# Patient Record
Sex: FEMALE | Race: WHITE | NOT HISPANIC OR LATINO | Employment: OTHER | URBAN - METROPOLITAN AREA
[De-identification: names, ages, dates, MRNs, and addresses within clinical notes are randomized per-mention and may not be internally consistent; named-entity substitution may affect disease eponyms.]

---

## 2019-04-18 ENCOUNTER — HISTORICAL (OUTPATIENT)
Dept: ADMINISTRATIVE | Facility: HOSPITAL | Age: 74
End: 2019-04-18

## 2019-04-22 ENCOUNTER — HISTORICAL (OUTPATIENT)
Dept: ADMINISTRATIVE | Facility: HOSPITAL | Age: 74
End: 2019-04-22

## 2019-06-12 ENCOUNTER — HISTORICAL (OUTPATIENT)
Dept: ADMINISTRATIVE | Facility: HOSPITAL | Age: 74
End: 2019-06-12

## 2019-07-24 ENCOUNTER — HISTORICAL (OUTPATIENT)
Dept: ADMINISTRATIVE | Facility: HOSPITAL | Age: 74
End: 2019-07-24

## 2020-11-02 ENCOUNTER — HISTORICAL (OUTPATIENT)
Dept: ADMINISTRATIVE | Facility: HOSPITAL | Age: 75
End: 2020-11-02

## 2021-03-04 ENCOUNTER — HISTORICAL (OUTPATIENT)
Dept: ADMINISTRATIVE | Facility: HOSPITAL | Age: 76
End: 2021-03-04

## 2021-03-18 ENCOUNTER — HISTORICAL (OUTPATIENT)
Dept: SURGERY | Facility: HOSPITAL | Age: 76
End: 2021-03-18

## 2021-06-07 ENCOUNTER — HISTORICAL (OUTPATIENT)
Dept: RADIOLOGY | Facility: HOSPITAL | Age: 76
End: 2021-06-07

## 2022-04-07 ENCOUNTER — HISTORICAL (OUTPATIENT)
Dept: ADMINISTRATIVE | Facility: HOSPITAL | Age: 77
End: 2022-04-07

## 2022-04-23 VITALS
BODY MASS INDEX: 38.16 KG/M2 | SYSTOLIC BLOOD PRESSURE: 104 MMHG | WEIGHT: 215.38 LBS | DIASTOLIC BLOOD PRESSURE: 59 MMHG | HEIGHT: 63 IN

## 2022-04-30 NOTE — ED PROVIDER NOTES
Patient:   Alexa Rodriguez             MRN: [id number]            FIN: [id number]               Age:   73 years     Sex:  Female     :  1945   Associated Diagnoses:   Closed left trimalleolar fracture; Dislocation of left ankle joint   Author:   Afia Rosales MD      Basic Information   Time seen: Date & time 2019 10:25:00.   History source: Patient, EMS.   Arrival mode: Ambulance.   History limitation: Clinical condition.   Additional information: Chief Complaint from Nursing Triage Note : Chief Complaint   2019 10:07 CDT      Chief Complaint           c/o fall getting out of car with left ankle pain. Possible dislocation with deformity noted to left ankle. 2+ palpable pedal pulse.  .      History of Present Illness   The patient presents with 73 year old female presents to the ED via EMS following fall while stepping out of her car. Obvious left ankle deformity. No LOC or head injury.  Had a piece of toast PTA..  The onset was just prior to arrival.  The course/duration of symptoms is constant.  Type of injury: fall.  Location: Left ankle. The character of symptoms is pain and loss of mobility.  The degree at present is moderate.  There are exacerbating factors including movement, weight bearing and walking.  The relieving factor is none.  The location where the incident occurred was car.  Risk factors consist of age.  Prior episodes: none.  Therapy today: emergency medical services.  Associated symptoms: none.  Additional history: none.        Review of Systems   Constitutional symptoms   Skin symptoms:  Abrasions, No rash,    Eye symptoms:  Vision unchanged, No blurred vision,    Respiratory symptoms:  No shortness of breath,    Cardiovascular symptoms:  No chest pain, no palpitations, no syncope.    Gastrointestinal symptoms:  No abdominal pain, no nausea, no vomiting.    Genitourinary symptoms   Musculoskeletal symptoms:  Reports: Left, ankle pain, obvious deformity.   Neurologic  symptoms:  No headache, no dizziness.    Hematologic/Lymphatic symptoms:  Bleeding tendency negative,              Additional review of systems information: All other systems reviewed and otherwise negative.      Health Status   Allergies:    Allergic Reactions (Selected)  Severity Not Documented  Codeine- Unsure.  Demerol HCl- Unsure..   Medications:  (Selected)   Documented Medications  Documented  Lasix 20 mg oral tablet: 20 mg = 1 tab(s), Oral, Daily  Oyster Shell Calcium with Vitamin D: 2 tab(s), Oral, Daily, 0 Refill(s)  Pepcid 20 mg oral tablet: 10 mg, Oral, At Bedtime  aspirin 81 mg oral tablet: 81 mg = 1 tab(s), Oral, Daily  atorvastatin 40 mg oral tablet: 40 mg = 1 tab(s), Oral, Daily  citalopram 10 mg oral tablet: 10 mg = 1 tab(s), Oral, Daily  clobetasol 0.05% topical cream: 1 silvia, TOP, As Directed  losartan 25 mg oral tablet: 25 mg = 1 tab(s), Oral, Daily  metoprolol succinate 100 mg oral capsule, extended release: 300 mg = 3 cap(s), Oral, Daily  omeprazole 20 mg oral DR capsule: 20 mg = 1 cap(s), Oral, Daily, 0 Refill(s).   Immunizations: Per nurse's notes.      Past Medical/ Family/ Social History   Medical history: Negative.   Surgical history: Negative.   Family history: Not significant.   Social history:    Social & Psychosocial Habits    Tobacco  04/18/2019  Use: Never (less than 100 in l    Patient Wants Consult For Cessation Counseling N/A.      Physical Examination               Vital Signs   Vital Signs   4/18/2019 10:07 CDT      Temperature Oral          36.6 DegC                             Temperature Oral (calculated)             97.88 DegF                             Peripheral Pulse Rate     56 bpm  LOW                             Respiratory Rate          16 br/min                             SpO2                      94 %                             Oxygen Therapy            Room air                             Systolic Blood Pressure   164 mmHg  HI                              Diastolic Blood Pressure  73 mmHg  .      Vital Signs (last 24 hrs)_____  Last Charted___________  Temp Oral     36.6 DegC  (APR 18 10:07)  Heart Rate Peripheral   62 bpm  (APR 18 11:15)  Resp Rate         21 br/min  (APR 18 11:15)  SBP      91 mmHg  (APR 18 11:15)  DBP      76 mmHg  (APR 18 11:15)  SpO2      97 %  (APR 18 11:15).   Measurements   4/18/2019 10:07 CDT      Weight Dosing             93 kg                             Weight Measured and Calculated in Lbs     205.03 lb                             Weight Estimated          93 kg                             Height/Length Dosing      157 cm                             Height/Length Estimated   157 cm                             Body Mass Index Estimated 37.73 kg/m2  .   Basic Oxygen Information   4/18/2019 10:07 CDT      SpO2                      94 %                             Oxygen Therapy            Room air  .   General:  Alert, no acute distress.    Skin:  Warm, dry.    Head:  Normocephalic, atraumatic.    Neck:  Supple, trachea midline.    Eye:  Normal conjunctiva.   Ears, nose, mouth and throat:  Oral mucosa moist.   Cardiovascular:  Regular rate and rhythm, Normal peripheral perfusion, No edema, Arterial pulses: Bilateral, lower extremity, dorsalis pedis, 2+.    Respiratory:  Respirations are non-labored.   Gastrointestinal:  Soft, Nontender, Non distended.    Musculoskeletal:  Old abrasion to left knee from previous fall, Ankle/foot: Left, tenderness, swelling, dislocation deformity, not aligned.    Neurological:  Alert and oriented to person, place, time, and situation, normal sensory observed, normal motor observed.    Psychiatric:  Cooperative.      Medical Decision Making   Documents reviewed:  Emergency department nurses' notes.   Orders  Launch Order Profile (Selected)   Inpatient Orders  Completed  CT Ankle Left W/O Contrast: Stat, 04/18/19 11:35:00 CDT, Other (please specify), trimalleolar fracture - surgical planning per Dr. Tavares,  None, Stretcher, Rad Type, Schedule this test, 04/18/19 11:35:00 CDT  Norco 7.5 mg-325 mg oral tablet: 1 tab(s), form: Tab, Oral, Once, first dose 04/18/19 13:21:00 CDT, stop date 04/18/19 13:21:00 CDT, STAT  XR Ankle Left 2 Views: Stat, 04/18/19 10:57:00 CDT, Post Reduction, None, Stretcher, Rad Type, Not Scheduled, 04/18/19 10:57:00 CDT  XR Ankle Left Minimum 3 Views: Stat, 04/18/19 10:27:00 CDT, Fall, None, Stretcher, Rad Type, Not Scheduled, 04/18/19 10:27:00 CDT  morphine: 4 mg, form: Injection, IV, AdHoc, first dose 04/18/19 11:49:00 CDT, stop date 04/18/19 11:49:00 CDT  ondansetron: 4 mg, form: Injection, IV, AdHoc, first dose 04/18/19 10:41:00 CDT, stop date 04/18/19 10:41:00 CDT  ondansetron: 4 mg, form: Injection, IV, AdHoc, first dose 04/18/19 11:49:00 CDT, stop date 04/18/19 11:49:00 CDT  Discontinued  Fall Risk Protocol: 04/18/19 10:36:07 CDT, Constant Order  Normal Saline (0.9% NS) IV 1,000 mL: 1,000 mL, 1,000 mL, IV, 999 mL/hr, start date 04/18/19 11:26:00 CDT  O2 Therapy: 04/18/19 12:08:59 CDT  Prescriptions  Completed  Norco 7.5 mg-325 mg oral tablet: 1 tab(s), Oral, q4hr, PRN PRN for pain, X 5 day(s), # 20 tab(s), 0 Refill(s).   Results review:     No qualifying data available.   Radiology results:  Rad Results (ST)  < 12 hrs   Accession: [id number]  Order: XR Ankle Left 2 Views  Report Dt/Tm: 04/18/2019 11:14  Report:   History:  Fracture     Comparison:  Earlier today     Findings:  2 views of the left ankle. Interval reduction of trimalleolar fracture  with improved position and alignment.     Impression:  As above.      Accession: [id number]  Order: XR Ankle Left Minimum 3 Views  Report Dt/Tm: 04/18/2019 10:48  Report:   Indication: Fall, injury     FINDINGS: There are acute fractures identified in the distal left  tibia and fibula. Significant lateral displacement of the distal  fracture fragments is visible including maintenance of the normal  anatomic position of the dome of the talus  with both the medial and  lateral malleoli although there is severe eversion and posterior  displacement of the left foot with respect to the shafts of the tibia  and fibula. An additional fracture fragment is visible superior to the  dome of the talus and is felt to be displaced from the medial aspect  of the distal tibia superior to the base of the medial malleolus. No  fractures are appreciated in the bones of the hindfoot.     IMPRESSION: Comminuted fractures of the distal left tibia and fibula  described above including lateral and posterior displacement of the  left foot from the shafts of the tibia and fibula.    * Final Report *    Reason For Exam  trimalleolar fracture - surgical planning per Dr. Tavares;Other (please specify)    Radiology Report  EXAMINATION  CT Extremity Lower Left W/O Contrast     TECHNIQUE  Helical-acquisition CT images of the left ankle were obtained without  the intravenous administration of iodinated contrast media.  Multiplanar reconstructions were accomplished by a CT technologist at  a separate workstation and pushed to PACS for physician review.     Total DLP (mGy-cm): 416 (value may include radiation due to  concomitantly performed CT imaging)  Automated tube current modulation and/or weight-based exposure dosing  is utilized, when appropriate, to reach lowest reasonably achievable  exposure rate.     INDICATION  Trimalleolar fracture, preoperative planning.     Comparison: No similar modality comparison is available. Ankle  radiographs performed earlier the same day were reviewed.     FINDINGS  Images were reviewed in bone and soft tissue windows.     There is interval reduction of the previously described tibiotalar  dislocation, with approximately 9 mm residual lateral talar  subluxation. Corresponding increase of the medial and lateral clear  spaces are also evident, approximately 9 mm a dislocation.  Additionally, there is asymmetric widening of tibiotalar spacing,  greater  at the medial aspect.     There is markedly improved alignment and angulation of the medial  malleolus and distal fibular fractures, with minimal displacement of  the posterior malleolus. Subtle nondisplaced cortical disruption at  the anterior aspect of the tibial metaphysis is better appreciated  relative to the radiographic views.   No other convincing acute osseous irregularity is identified. Chronic  posttraumatic changes of the inferior medial malleolus contour are  appreciated, with multiple well-corticated osseous fragment.  Additionally, there are scattered degenerative changes throughout the  midfoot.     The courses of the medial and lateral posterior flexor tendons are  grossly normal, with no convincing evidence of dislocation or fracture  entrapment. No findings of high-grade tendon disruption or retraction  are visualized. The anterior tendon group is unremarkable.  The regional musculature is unremarkable.  Disorganized. Similar subcutaneous edema is visualized diffusely, with  no drainable collection or evidence of large joint effusion.     Scattered vascular calcification are evident. No convincing acute or  focal neurovascular abnormality is visualized within modality/protocol  limitations.     IMPRESSION  1.  Interval reduction of tibiotalar dislocation and associated  fractures, with mild residual malalignment described above.     2.  More conspicuous nondisplaced cortical disruption of the distal  anterior tibial metaphysis.     3.  No definite evidence of tendon dislocation/entrapment, or other  focal soft tissue abnormality.       Signature Line  Electronically Signed By: Rubén Xiao MD  Date/Time Signed: 04/18/2019 13:24   .      Reexamination/ Reevaluation   Vital signs   Basic Oxygen Information   4/18/2019 10:07 CDT      SpO2                      94 %                             Oxygen Therapy            Room air     Notes: Ankle fracture dislocation reduced with improved  alignment and post-procedurally neurovascularly intact. Discussed with orthopedics on call who requests CT for surgical planning, then ok with DC home and follow up in clinic early next week to plan operative repair. Will DC home w/ analgesics. I informed the patient of the risks associated with opiate use and the option to fill less than the prescribed amount. ED return precautions discussed with patient at the bedside and provided in the printed discharge instructions. All questions answered. Patient should follow up with ortho. .      Procedure   Fracture/ Dislocation Procedure   Confirmed: Patient, procedure, side, and site correct.    Consent: Patient, Has signed consent.    Indication: Dislocation, Fracture.    Location: Left, Ankle.    Pre procedure exam: Circulation, motor, and sensory intact.    Procedural sedation: Propofol , IV, See nurse's notes.    Monitoring: Cardiac, blood pressure, continuous pulse oximetry, See nurse's notes.    Technique: Traction-countertraction method, Internal-external rotation method.    Post-procedure exam: Circulation, motor, and sensory intact, Alignment improved, Recovered from sedation.    Immobilization: Splint: Posterior, stirrup.    Patient tolerated: Well.    Complications: None.    Performed by: Self.    Total time: 25 minutes.    Procedural sedation   Confirmed: Patient and procedure correct.    Consent: Patient, Has signed consent.    Indication: Closed reduction, Fracture manipulation.    Monitoring: Cardiac, blood pressure, continuous pulse oximetry.    Preparation: Suction, IV access, Constant attendance, Supplemental oxygen.    ASA Class: 2- mild systemic disease.    Physical exam: See physical exam documentation.    Pre sedation vital signs: See nurse's notes.    Procedural sedation: See nurse's notes.    Post sedation vital signs: See nurse's notes.    Procedure Time: See hospital procedure form, Start time: 4/18/2019 10:48:00, Sedation achieved in 7 minutes,  End time: 4/18/2019 10:57:00.    Post sedation condition: Improved.    Patient tolerated: Well.    Complications: None.    Performed by: Self.       Impression and Plan   Diagnosis   Closed left trimalleolar fracture ([id number] S82.852A)   Dislocation of left ankle joint ([id number] S93.05XA)      Calls-Consults   -  4/18/2019 11:17:00 , Isabel MICHELE, Aaron SWANSON, phone call, consult, will be out of town and unable to repair, advises call on call orthopedist.    -  Chaitanya ALEJO MD, Wally WATSON, orthopedics, recommends CT, DC home, follow up next week for surgical repair.    Plan   Condition: Improved.    Disposition: Discharged: Time  4/18/2019 13:21:00, to home.    Prescriptions: Launch prescriptions   Pharmacy:  [place] 7.5 mg-325 mg oral tablet (Prescribe): 1 tab(s), Oral, q4hr, PRN PRN for pain, X 5 day(s), # 20 tab(s), 0 Refill(s).    Patient was given the following educational materials: Ankle Fracture, Ankle Dislocation, Cast or Splint Care ([facility]), Moderate Conscious Sedation, Adult, Care After, Opiods - LF What You Need to Know About Prescription Opioid Pain Medicine (Custom).    Follow up with: Wally Tavares  4/22/2019 Call for followup appointment; Report to Emergency Department if symptoms return or worsen.    Counseled: Patient, Regarding diagnosis, Regarding diagnostic results, Regarding treatment plan, Regarding prescription, Patient indicated understanding of instructions.    Notes: IRamon, acted solely as a scribe for and in the presence of Dr. Rosales who performed the service., I, Afia Rosales, have independently performed the history, physical, medical decision making and procedures as documented above and agree with the scribe's documentation. .

## 2022-04-30 NOTE — DISCHARGE SUMMARY
Patient:   Alexa Rodriguez             MRN: [id number]            FIN: [id number]               Age:   73 years     Sex:  Female     :  1945   Associated Diagnoses:   Fracture, trimalleolar   Author:   Chaitanya ALEJO MD, Wally WATSON      Discharge Information   Discharge Summary Information:  Admitted  2019, Discharged  2019, Admitting diagnosis.         Admitting physician: Wally Tavares JR., MD.         Discharge diagnosis: Fracture, trimalleolar ([id number] S82.853A).       Physical Examination   Vital Signs   2019 7:40 CDT       Temperature Oral          36.6 DegC                             Temperature Oral (calculated)             97.88 DegF                             Peripheral Pulse Rate     80 bpm                             Respiratory Rate          18 br/min                             SpO2                      94 %                             Systolic Blood Pressure   166 mmHg  HI                             Diastolic Blood Pressure  78 mmHg                             Mean Arterial Pressure, Cuff              107 mmHg    2019 6:00 CDT       Oxygen Therapy            Room air    2019 3:00 CDT       Temperature Oral          36.6 DegC                             Temperature Oral (calculated)             97.88 DegF                             Peripheral Pulse Rate     77 bpm                             SpO2                      97 %                             Oxygen Therapy            Room air                             Systolic Blood Pressure   133 mmHg                             Diastolic Blood Pressure  78 mmHg    2019 0:00 CDT       Temperature Oral          36.9 DegC                             Temperature Oral (calculated)             98.42 DegF                             Peripheral Pulse Rate     86 bpm                             Respiratory Rate          18 br/min                             SpO2                      96 %                             Oxygen  Therapy            Room air                             Oxygen Therapy            Room air                             Systolic Blood Pressure   153 mmHg  HI                             Diastolic Blood Pressure  67 mmHg     Splint c/d/i. +EHL,FHL. SITLT. +BCR      Hospital Course   Hospital Course   Admitted from: from home.     Transferred via: by car.     Admitting diagnosis: Fracture, trimalleolar (CPV03-FR S82.853A).     Admission disposition: admit to surgery.     Length of stay: days  1.        Discharge Plan   Discharge Summary Plan   Discharge Status: improved.     Discharge instructions given: to patient, to caregiver.     Discharge disposition: discharge to home (into the care of family member, with home health care).     Prescriptions: reviewed (with patient, with caregiver), written and given to patient.

## 2022-04-30 NOTE — OP NOTE
Patient:   Alexa Rodriguez             MRN: [id number]            FIN: [id number]               Age:   75 years     Sex:  Female     :  1945   Associated Diagnoses:   None   Author:   Wally Tavares Jr, MD      SURGEON: Wally Tavares MD   ASSISTANT: Laura Luna NP. Mrs. Luna was essential in manipulating the leg and closure.    PREOPERATIVE DIAGNOSIS:   Right knee medial meniscus tear    POSTOPERATIVE DIAGNOSIS:   Right knee medial meniscus tear, medial femoral condyle chondromalacia,     PROCEDURE PERFORMED:  1.  Right knee arthroscopic partial medial meniscectomy      ESTIMATED BLOOD LOSS: 10cc.    COMPLICATIONS: None.    TOURNIQUET TIME: ~15 minutes.    ANTIBIOTICS: Ancef     INDICATIONS FOR PROCEDURE: Alexa is a 75y.o. female who has had ongoing right knee pain. The patient has had to limit activity due to intermittent pain & occasional mechanical symptoms. An MRI was performed that showed a meniscus tear that I felt would be amenable to arthroscopic debridement. The patient decided to opt for surgery after failing conservative management.    OPERATIVE REPORT: Alexa was initially seen in the preoperative holding area where history and physical were reviewed without change. The operative leg was marked, consents were reviewed, and any questions were answered for the patient and family. The patient was then taken back to the operating room, placed supine on the operating table with all bony prominences padded. Then a nonsterile tourniquet was placed around the upper thigh. The patient was induced under general anesthesia. The operative lower extremity was prepped and draped in standard sterile fashion. A timeout led by the surgeon was performed, and preoperative antibiotics were given. The limp was exsanguinated with gravity. The tourniquet was raised to 100 mmHg over the systolic blood pressure.    The knee was then flexed and the inferolateral portal was created with an #11 blade  scalpel.    The camera was introduced, using the blunt trocar, into the suprapatellar pouch. The undersurface of the patella was found to have no chondral wear and the trochlear groove was found to have no chondral wear . The camera was then taken down into the lateral gutter, where no loose bodies and no plica were found. The camera was then brought into the medial gutter, where no loose bodies and no plica were seen. The camera was then brought into the medial compartment.    An inferomedial portal was then localized with a spinal needle, and created using an #11 blade. The medial meniscus was probed and found to have a complex tear involving the posterior body and horn. A combination of baskets and mani were used to debride the meniscal flap down to a stable margin of approximately 60 percent remaining and then shaver was used to smooth the edges. The medial femoral condyle was found to have diffuse grade 3. The medial tibial plateau demonstrated minimal wear.    The camera was then turned to the notch, where the ACL was found to be intact.    Then the leg was brought into figure-of-four position. The camera was brought into the lateral compartment. The lateral meniscus was probed and found to have no tears.  The lateral femoral condyle was found to have minimal chondral wear. The lateral tibial plateau had minimal chondral wear.    The knee was once more examined for loose bodies, of which none were found. The knee was then evacuated of all fluids. The portals were closed with 3-0 monocyrl interrupted sutures and steristrips. 10mL of 0.25% Bupivicaine were infiltrated around each portal. A sterile dressing was placed, and the tourniquet was deflated after _ minutes. The patient was awakened from anesthesia and taken to the postoperative care unit in stable condition.

## 2022-04-30 NOTE — OP NOTE
DATE OF SURGERY:    04/25/2019    SURGEON:  Wally Tavares Jr, MD  ASSISTANT:  None    PREOP DIAGNOSIS:  Left trimalleolar ankle fracture.    POSTOP DIAGNOSIS:  Left trimalleolar ankle fracture.    PROCEDURE:  Open reduction and internal fixation of left trimalleolar ankle fracture including posterior malleolus.    ANESTHESIA:  General plus regional.    COMPLICATIONS:  None.    IMPLANTS:  Synthes.    HISTORY OF PRESENT ILLNESS:  Ms. Liu is a very pleasant 73-year-old female who fell, sustained a displaced left trimalleolar ankle fracture.  I recommended repair.  I discussed with her the risks, benefits, and alternatives to therapy, and she elected to proceed    DESCRIPTION OF PROCEDURE:  Ms. Liu was initially seen in preoperative unit where history and physical were reviewed without change.  Her left leg was marked.  Her consents were reviewed.  All questions were answered.  She was taken to the operating room and placed initially supine on the operating table where general anesthesia was induced, then flipped to the prone position where all bony prominences were padded.  Her left lower extremity was prepped and draped in a sterile fashion.  Attending led time-out, confirmed the operative side.  Preoperative antibiotics were administered.  Then we began the procedure.       I started with standard posterolateral approach.  I sharply incised through skin and spread to subcutaneous tissue, being mindful of the sural nerve, carried down through the fascia and through the interval between the Achilles and the peroneal tendon, and I came down onto the posterior aspect of the tibia, and her posterior malleolus was lifted off.  I was able to reduce this by plantar flexing the foot and pushing down onto the posterior malleolus.  I then pinned it temporarily with a K-wire and then placed a 4-hole VAT plate and secured it in a buttress-type fashion, initially on the shaft and then a couple of locking screws in  the actual piece as well.  After this, I then turned my attention to the lateral side.  I then retracted the peroneal tendons medially and was able to visualize the fibula.  It was a comminuted fracture and I was able to restore her length and then placed a 7-hole 1/3 tubular in a bridge-type fashion in order to reapproximate the fracture.  Happy with this, irrigated the wound and closed the incision in layers.  I then temporarily placed a dressing and then flipped her supine and re-prepped and draped.  I then turned my attention to the medial malleolus.  I made an incision directly over the medial malleolus, sharply incising through skin and subcutaneous tissue, being mindful of the saphenous neurovascular bundle, came down to the fracture site.  There was some periosteum which was within the fracture.  I was able to book open the fracture and then clean this out and then was able to obtain anatomic reduction.  I then placed two 4.0 mm partially threaded screws, 1 anteriorly and 1 posteriorly.  I then again irrigated the wound, closed the incision in layers.  She was placed into a sterile dressing and a bivalve cast.  She was awoken from anesthesia and transferred to the postoperative care unit in good condition.        ______________________________  MD MILENA Veliz Jr/TIFFANI  DD:  04/25/2019  Time:  02:51PM  DT:  04/25/2019  Time:  03:13PM  Job #:  [id number]

## 2022-05-01 NOTE — HISTORICAL [facility]
This is a historical note converted from Shannon. Formatting and pictures may have been removed.  Please reference Shannon for original formatting and attached multimedia. Chief Complaint  6.5 week s/p Left ankle Orif, Sx-4/25/19--Gl--7/24/19. ?Pt states shes doing great , no pain or swelling. ?Pt ?is ambulating today with her walking boot.  History of Present Illness  4/25/2019: ORIF left trimalleolar ankle fracture  ?   She returns today.? Her ankle pain is under good control. ? [1]  Physical Exam  Vitals & Measurements  BP:?130/74?  HT:?157?cm? WT:?91?kg? BMI:?36.92?  Incision healed. ?Surprisingly good range of motion of the ankle. ?Nontender over the fracture  Assessment/Plan  Closed trimalleolar fracture of ankle?S82.853A  She can ambulate in the boot.? She can transition out of the boot on July 15. ?I will see her back?in late July with radiographs of the left ankle  Ordered:  Clinic Follow up, *Est. 07/24/19 3:00:00 CDT, Order for future visit, Closed trimalleolar fracture of ankle, [facility]  Post-Op follow-up visit [id number] PC, Closed trimalleolar fracture of ankle, [facility], 06/12/19 14:53:00 CDT  XR Ankle Left Minimum 3 Views, Routine, 06/12/19 13:56:00 CDT, Fracture, None, Stretcher, Patient Has IV?, Rad Type, Closed trimalleolar fracture of ankle, Not Scheduled, 06/12/19 13:56:00 CDT  ?  Referrals  Clinic Follow up, *Est. 07/24/19 3:00:00 CDT, Order for future visit, Closed trimalleolar fracture of ankle, [facility]   Problem List/Past Medical History  Ongoing  Fracture, trimalleolar  Obesity  Historical  Depressed  Falls  Hyperlipidemia  Hypertension  Hyperthyroidism  Neuropathy  Reflux flow  Weight  Procedure/Surgical History  Injection, anesthetic agent; other peripheral nerve or branch (04/25/2019)  Introduction of Anesthetic Agent into Peripheral Nerves and Plexi, Percutaneous Approach (04/25/2019)  Open treatment of trimalleolar ankle fracture, includes internal fixation, when  performed, medial and/or lateral malleolus; with fixation of posterior lip (04/25/2019)  ORIF Ankle (Left) (04/25/2019)  Reposition Left Fibula with Internal Fixation Device, Open Approach (04/25/2019)  Reposition Left Tibia with Internal Fixation Device, Open Approach (04/25/2019)  Application of short leg splint (calf to foot) (04/18/2019)  Immobilization of Left Lower Leg using Splint (04/18/2019)  Cataract  Hysterectomy  Mohs surgery  Tonsil   Medications  aspirin 81 mg oral Delayed Release (EC) tablet, 81 mg= 1 tab(s), Oral, BID  aspirin 81 mg oral tablet, 81 mg= 1 tab(s), Oral, Daily,? ?Not taking: Last Dose Date/Time Unknown  atorvastatin 40 mg oral tablet, 40 mg= 1 tab(s), Oral, Daily  cetirizine 5 mg oral tablet, 10 mg= 2 tab(s), Oral, Daily  citalopram 10 mg oral tablet, 10 mg= 1 tab(s), Oral, Daily  clobetasol 0.05% topical cream, 1 silvia, TOP, As Directed  Colace 100 mg oral capsule, 200 mg= 2 cap(s), Oral, Daily  Lasix 20 mg oral tablet, 20 mg= 1 tab(s), Oral, Daily  losartan 25 mg oral tablet, 25 mg= 1 tab(s), Oral, Daily  metoprolol succinate 100 mg oral capsule, extended release, 100 mg= 1 cap(s), Oral, Daily  omeprazole 20 mg oral DR capsule, 20 mg= 1 cap(s), Oral, Daily  Oyster Shell Calcium with Vitamin D, 2 tab(s), Oral, Daily  Pepcid 20 mg oral tablet, 10 mg, Oral, At Bedtime  TheraTears Contact Lens ophthalmic solution, 1 drop(s), OPTH, BID, PRN  Allergies  Demerol HCl?(unsure)  codeine?(unsure)  morphine?(itching)  Social History  Abuse/Neglect  No, No, Yes, 06/12/2019  Alcohol  Current, Wine, 04/22/2019  Tobacco  Never (less than 100 in lifetime), No, 06/12/2019  Family History  Family history is negative  Health Maintenance  Health Maintenance  ???Pending?(in the next year)  ??? ??OverDue  ??? ? ? ?Advance Directive due??01/01/19??and every 1??year(s)  ??? ? ? ?Alcohol Misuse Screening due??01/01/19??and every 1??year(s)  ??? ? ? ?Cognitive Screening due??01/01/19??and every 1??year(s)  ??? ?  ? ?Functional Assessment due??01/01/19??and every 1??year(s)  ??? ? ? ?Geriatric Depression Screening due??01/01/19??and every 1??year(s)  ??? ? ? ?Breast Cancer Screening (Senior Wellness) due??01/19/19??and every 2??year(s)  ??? ??Due?  ??? ? ? ?ADL Screening due??06/12/19??and every 1??year(s)  ??? ? ? ?Bone Density Screening due??06/12/19??Variable frequency  ??? ? ? ?Colorectal Screening (Senior Wellness) due??06/12/19??and every?  ??? ? ? ?Pneumococcal Vaccine due??06/12/19??Variable frequency  ??? ? ? ?Pneumococcal Vaccine due??06/12/19??and every?  ??? ? ? ?Tetanus Vaccine due??06/12/19??and every 10??year(s)  ??? ? ? ?Zoster Vaccine due??06/12/19??and every 100??year(s)  ??? ??Due In Future?  ??? ? ? ?Fall Risk Assessment not due until??01/01/20??and every 1??year(s)  ??? ? ? ?Obesity Screening not due until??01/01/20??and every 1??year(s)  ??? ? ? ?Hypertension Management-BMP not due until??04/21/20??and every 1??year(s)  ??? ? ? ?Aspirin Therapy for CVD Prevention not due until??04/26/20??and every 1??year(s)  ??? ? ? ?Hypertension Management-Blood Pressure not due until??05/07/20??and every 1??year(s)  ???Satisfied?(in the past 1 year)  ??? ??Satisfied?  ??? ? ? ?Aspirin Therapy for CVD Prevention on??04/26/19.??Satisfied by Wally Tavares JR., MD  ??? ? ? ?Blood Pressure Screening on??06/12/19.??Satisfied by Usha Maynard  ??? ? ? ?Body Mass Index Check on??06/12/19.??Satisfied by Usha Maynard  ??? ? ? ?Diabetes Screening on??04/22/19.??Satisfied by Andrea Zhou  ??? ? ? ?Fall Risk Assessment on??04/26/19.??Satisfied by Jahaira Gloria RN  ??? ? ? ?Hypertension Management-Blood Pressure on??06/12/19.??Satisfied by Usha Maynard  ??? ? ? ?Obesity Screening on??06/12/19.??Satisfied by Usha Maynard  ?  ?  Diagnostic Results  Left ankle radiographs show interval healing     [1]?Office Visit Note; Chaitanya ALEJO MD, Wally WATSON 05/08/2019 14:48 CDT

## 2022-05-01 NOTE — HISTORICAL [facility]
This is a historical note converted from Shannon. Formatting and pictures may have been removed.  Please reference Shannon for original formatting and attached multimedia. Chief Complaint  3 month s/p Left ankle ORIF. here for eval and xrays. ?sx 4/25/19 ?gl. 7/24/19.  History of Present Illness  4/25/2019: ORIF left trimalleolar ankle fracture  ?   She returns today.? She is out of the brace.? She is ambulating without assistive devices.  Physical Exam  Vitals & Measurements  BP:?130/74?  HT:?157?cm? WT:?91?kg? BMI:?36.92?  Incisions healed. ?Range of motion full. ?Distally she is neurovascular intact  Assessment/Plan  Closed displaced trimalleolar fracture of lower leg?S82.853A  Doing well status post above.? Fracture looks healed.? Gradually increase activities. ?I will see her back as needed  Ordered:  Post-Op follow-up visit [id number] PC, Closed displaced trimalleolar fracture of lower leg, [facility], 07/24/19 14:24:00 CDT  XR Ankle Left Minimum 3 Views, Routine, 07/24/19 13:57:00 CDT, Pain, None, Stretcher, Patient Has IV?, Rad Type, Closed displaced trimalleolar fracture of lower leg, Not Scheduled, 07/24/19 13:57:00 CDT  ?  Orders:  Clinic Follow-up PRN, 07/24/19 14:24:00 CDT, Future Order, [facility]  Referrals  Clinic Follow-up PRN, 07/24/19 14:24:00 CDT, Future Order, [facility]   Problem List/Past Medical History  Ongoing  Fracture, trimalleolar  Obesity  Historical  Depressed  Falls  Hyperlipidemia  Hypertension  Hyperthyroidism  Neuropathy  Reflux flow  Weight  Procedure/Surgical History  Injection, anesthetic agent; other peripheral nerve or branch (04/25/2019)  Introduction of Anesthetic Agent into Peripheral Nerves and Plexi, Percutaneous Approach (04/25/2019)  Open treatment of trimalleolar ankle fracture, includes internal fixation, when performed, medial and/or lateral malleolus; with fixation of posterior lip (04/25/2019)  ORIF Ankle (Left) (04/25/2019)  Reposition Left Fibula with  Internal Fixation Device, Open Approach (04/25/2019)  Reposition Left Tibia with Internal Fixation Device, Open Approach (04/25/2019)  Application of short leg splint (calf to foot) (04/18/2019)  Immobilization of Left Lower Leg using Splint (04/18/2019)  Cataract  Hysterectomy  Mohs surgery  Tonsil   Medications  aspirin 81 mg oral Delayed Release (EC) tablet, 81 mg= 1 tab(s), Oral, BID  aspirin 81 mg oral tablet, 81 mg= 1 tab(s), Oral, Daily  atorvastatin 40 mg oral tablet, 40 mg= 1 tab(s), Oral, Daily  cetirizine 5 mg oral tablet, 10 mg= 2 tab(s), Oral, Daily  citalopram 10 mg oral tablet, 10 mg= 1 tab(s), Oral, Daily  clobetasol 0.05% topical cream, 1 silvia, TOP, As Directed  Colace 100 mg oral capsule, 200 mg= 2 cap(s), Oral, Daily  Lasix 20 mg oral tablet, 20 mg= 1 tab(s), Oral, Daily  losartan 25 mg oral tablet, 25 mg= 1 tab(s), Oral, Daily  losartan 50 mg oral tablet, 50 mg= 1 tab(s), Oral, Daily  metoprolol succinate 100 mg oral capsule, extended release, 100 mg= 1 cap(s), Oral, Daily  omeprazole 20 mg oral DR capsule, 20 mg= 1 cap(s), Oral, Daily  Oyster Shell Calcium with Vitamin D, 2 tab(s), Oral, Daily  Pepcid 20 mg oral tablet, 10 mg, Oral, At Bedtime  terbinafine 1% topical cream, 1 silvia, TOP, BID  TheraTears Contact Lens ophthalmic solution, 1 drop(s), OPTH, BID, PRN  Allergies  Demerol HCl?(unsure)  codeine?(unsure)  morphine?(itching)  Social History  Abuse/Neglect  No, No, Yes, 06/12/2019  Alcohol  Current, Wine, 04/22/2019  Tobacco  Never (less than 100 in lifetime), No, 07/24/2019  Family History  Family history is negative  Health Maintenance  Health Maintenance  ???Pending?(in the next year)  ??? ??OverDue  ??? ? ? ?Advance Directive due??01/01/19??and every 1??year(s)  ??? ? ? ?Alcohol Misuse Screening due??01/01/19??and every 1??year(s)  ??? ? ? ?Cognitive Screening due??01/01/19??and every 1??year(s)  ??? ? ? ?Functional Assessment due??01/01/19??and every 1??year(s)  ??? ? ? ?Geriatric  Depression Screening due??01/01/19??and every 1??year(s)  ??? ? ? ?Breast Cancer Screening (Senior Wellness) due??01/19/19??and every 2??year(s)  ??? ??Due?  ??? ? ? ?ADL Screening due??07/24/19??and every 1??year(s)  ??? ? ? ?Bone Density Screening due??07/24/19??Variable frequency  ??? ? ? ?Colorectal Screening (Senior Wellness) due??07/24/19??and every?  ??? ? ? ?Pneumococcal Vaccine due??07/24/19??Variable frequency  ??? ? ? ?Pneumococcal Vaccine due??07/24/19??and every?  ??? ? ? ?Tetanus Vaccine due??07/24/19??and every 10??year(s)  ??? ? ? ?Zoster Vaccine due??07/24/19??and every 100??year(s)  ??? ??Due In Future?  ??? ? ? ?Fall Risk Assessment not due until??01/01/20??and every 1??year(s)  ??? ? ? ?Obesity Screening not due until??01/01/20??and every 1??year(s)  ??? ? ? ?Hypertension Management-BMP not due until??04/21/20??and every 1??year(s)  ??? ? ? ?Aspirin Therapy for CVD Prevention not due until??04/26/20??and every 1??year(s)  ??? ? ? ?Hypertension Management-Blood Pressure not due until??06/11/20??and every 1??year(s)  ???Satisfied?(in the past 1 year)  ??? ??Satisfied?  ??? ? ? ?Aspirin Therapy for CVD Prevention on??04/26/19.??Satisfied by Wally Tavares JR., MD  ??? ? ? ?Blood Pressure Screening on??07/24/19.??Satisfied by Netta Loya L. L.  ??? ? ? ?Body Mass Index Check on??07/24/19.??Satisfied by NiNetta bernstein  ??? ? ? ?Diabetes Screening on??04/22/19.??Satisfied by Andrea Zhou  ??? ? ? ?Fall Risk Assessment on??04/26/19.??Satisfied by Jahaira Gloria RN  ??? ? ? ?Hypertension Management-Blood Pressure on??07/24/19.??Satisfied by Netta Loya  ??? ? ? ?Obesity Screening on??07/24/19.??Satisfied by Netta Loya.  ?  Diagnostic Results  Ankle radiographs show?interval healing.

## 2022-05-01 NOTE — HISTORICAL [facility]
This is a historical note converted from Shannon. Formatting and pictures may have been removed.  Please reference Shannon for original formatting and attached multimedia. Chief Complaint  New patient here with right knee pain no injury overtime pain. Did twist in March. Xrays today.  History of Present Illness  She is a pleasant 75-year-old who initially twisted her knee in March 2020. ?She has had pain?since that point. ?The pains intermittent. ?She notes it worse with twisting activities. ?It somewhat better at rest. ?The pain is located medially.? It is interfering with her yoga.  Review of Systems  Comprehensive review of system?was performed with no exceptions other than noted in the history of present illness  Physical Exam  Vitals & Measurements  HR:?60(Peripheral)? BP:?133/77?  HT:?157.00?cm? WT:?91.000?kg? BMI:?36.92?  Gen: WN, WD, NAD  Card/Res: NL breathing, +distal pulses  Abdomen: ND  Standing exam  stance: normal alignment, no significant leg-length discrepancy  gait:?Antalgic limp  ?   Knee examination  - General comments: unremarkable appearance  ?   - Tenderness: Medial joint line  ?   Knee??????????RIGHT??????????LEFT  Skin: ??????????Intact ??????????Intact  ROM:??????????0-130??????????0-130  Effusion:????? +???????????? Neg  MJL TTP:????? + ???????????? Neg  LJL TTP: ?????? Neg ???????????? Neg  Carmelita:? ?+ ???????????? Neg  Pat crep:?????? Neg ???????????????Neg  Patella TTPs: Neg ???????????????Neg  Patella grind: Neg??????????? ?Neg  Lachman: ?????Neg ????????????????????Neg  Pivot shift: ?????Neg ???????????? Neg  Valgus stress: Neg ???????????????Neg  Varus stress: Neg ???????????????Neg  Posterior drawer: Neg ??????????Neg  ?   N-V ????????????????????intact??????????intact  Hip:?????????????????????????nml?????????? nml  ?   Lower extremity edema:Negative  ?  Assessment/Plan  1.?Medial meniscus tear?S83.249A  ?We will try an injection today. ?If her pain persist will get MRI  ?  Risks of  cortisone were discussed with the patient including hypopigmentation subcutaneous fat atrophy, and post injection pain flare. The patient understood these risks and requested to proceed. The right knee was prepped with betadine. The 1cc of steroid and 3cc of lidocaine injection was administered under sterile techinique. The injection was administered in clinic by meWally, and the patient tolerated the procedure well.  ?  Ordered:  betamethasone, 12 mg, Intra-Articular, Once, first dose 11/02/20 15:00:00 CST, stop date 11/02/20 15:00:00 CST  Lidocaine inj., 2 mL, Intra-Articular, Once, first dose 11/02/20 15:00:00 CST, stop date 11/02/20 15:00:00 CST  asp/inj jnt/bursa, major [id number] PC, 11/02/20 15:00:00 CST, [facility], Routine, 11/02/20 15:00:00 CST, Medial meniscus tear  Office/Outpatient Visit Level 3 Established [id number] PC, Medial meniscus tear, [facility], 11/02/20 15:00:00 CST  ?  Orders:  XR Knee Right 4 or More Views, Routine, 11/02/20 14:09:00 CST, None, Stretcher, Patient Has IV?, Rad Type, Right knee pain, Not Scheduled, 11/02/20 14:09:00 CST   Problem List/Past Medical History  Ongoing  Fracture, trimalleolar  Medial meniscus tear  Obesity  Historical  Depressed  Falls  Hyperlipidemia  Hypertension  Hyperthyroidism  Neuropathy  Reflux flow  Weight  Procedure/Surgical History  Injection, anesthetic agent; other peripheral nerve or branch (04/25/2019)  Introduction of Anesthetic Agent into Peripheral Nerves and Plexi, Percutaneous Approach (04/25/2019)  Open treatment of trimalleolar ankle fracture, includes internal fixation, when performed, medial and/or lateral malleolus; with fixation of posterior lip (04/25/2019)  ORIF Ankle (Left) (04/25/2019)  Reposition Left Fibula with Internal Fixation Device, Open Approach (04/25/2019)  Reposition Left Tibia with Internal Fixation Device, Open Approach (04/25/2019)  Application of short leg splint (calf to foot)  (04/18/2019)  Immobilization of Left Lower Leg using Splint (04/18/2019)  Cataract  Hysterectomy  Mohs surgery  Tonsil   Medications  aspirin 81 mg oral Delayed Release (EC) tablet, 81 mg= 1 tab(s), Oral, BID  aspirin 81 mg oral tablet, 81 mg= 1 tab(s), Oral, Daily  atorvastatin 40 mg oral tablet, 40 mg= 1 tab(s), Oral, Daily  [place], 12 mg, Intra-Articular, Once  cetirizine 5 mg oral tablet, 10 mg= 2 tab(s), Oral, Daily  citalopram 10 mg oral tablet, 10 mg= 1 tab(s), Oral, Daily  citalopram 20 mg oral tablet, 20 mg= 1 tab(s), Oral, Daily  clobetasol 0.05% topical cream, 1 silvia, TOP, As Directed  Colace 100 mg oral capsule, 200 mg= 2 cap(s), Oral, Daily  Lasix 20 mg oral tablet, 20 mg= 1 tab(s), Oral, Daily  lidocaine 2% injectable solution, 2 mL, Intra-Articular, Once  losartan 100 mg oral tablet, 100 mg= 1 tab(s), Oral, Daily  losartan 25 mg oral tablet, 25 mg= 1 tab(s), Oral, Daily  losartan 50 mg oral tablet, 50 mg= 1 tab(s), Oral, Daily  metoprolol succinate 100 mg oral capsule, extended release, 100 mg= 1 cap(s), Oral, Daily  metoprolol succinate 100 mg oral tablet, extended release, 100 mg= 1 tab(s), Oral, Daily  omeprazole 20 mg oral DR capsule, 20 mg= 1 cap(s), Oral, Daily  Oyster Shell Calcium with Vitamin D, 2 tab(s), Oral, Daily  Pepcid 20 mg oral tablet, 10 mg, Oral, At Bedtime  terbinafine 1% topical cream, 1 silvia, TOP, BID  TheraTears Contact Lens ophthalmic solution, 1 drop(s), OPTH, BID, PRN  Allergies  Demerol HCl?(unsure)  codeine?(unsure)  morphine?(itching)  Social History  Abuse/Neglect  No, 11/02/2020  Alcohol  Current, Wine, 04/22/2019  Tobacco  Never (less than 100 in lifetime), No, 11/02/2020  Family History  Family history is negative  Health Maintenance  Health Maintenance  ???Pending?(in the next year)  ??? ??OverDue  ??? ? ? ?Breast Cancer Screening due??01/19/19??and every 2??year(s)  ??? ? ? ?Advance Directive due??01/02/20??and every 1??year(s)  ??? ? ? ?Cognitive Screening  due??01/02/20??and every 1??year(s)  ??? ? ? ?Functional Assessment due??01/02/20??and every 1??year(s)  ??? ? ? ?Aspirin Therapy for CVD Prevention due??04/26/20??and every 1??year(s)  ??? ??Due?  ??? ? ? ?Influenza Vaccine due??10/01/20??and every 1??day(s)  ??? ? ? ?ADL Screening due??11/02/20??and every 1??year(s)  ??? ? ? ?Bone Density Screening due??11/02/20??Variable frequency  ??? ? ? ?Colorectal Screening due??11/02/20??Unknown Frequency  ??? ? ? ?Depression Screening due??11/02/20??Unknown Frequency  ??? ? ? ?Medicare Annual Wellness Exam due??11/02/20??and every 1??year(s)  ??? ? ? ?Pneumococcal Vaccine due??11/02/20??Unknown Frequency  ??? ? ? ?Tetanus Vaccine due??11/02/20??and every 10??year(s)  ??? ? ? ?Zoster Vaccine due??11/02/20??Unknown Frequency  ??? ??Due In Future?  ??? ? ? ?Obesity Screening not due until??01/01/21??and every 1??year(s)  ??? ? ? ?Alcohol Misuse Screening not due until??01/02/21??and every 1??year(s)  ??? ? ? ?Fall Risk Assessment not due until??01/02/21??and every 1??year(s)  ??? ? ? ?Hypertension Management-BMP not due until??01/05/21??and every 1??year(s)  ???Satisfied?(in the past 1 year)  ??? ??Satisfied?  ??? ? ? ?Alcohol Misuse Screening on??11/02/20.??Satisfied by Netta Loya LHazel LHazel  ??? ? ? ?Blood Pressure Screening on??11/02/20.??Satisfied by Netta Loya L.  ??? ? ? ?Body Mass Index Check on??11/02/20.??Satisfied by Netta Loya  ??? ? ? ?Fall Risk Assessment on??11/02/20.??Satisfied by Netta Loya  ??? ? ? ?Hypertension Management-Blood Pressure on??11/02/20.??Satisfied by Netta Loya  ??? ? ? ?Obesity Screening on??11/02/20.??Satisfied by Netta Loya  ?  Diagnostic Results  Knee radiographs show minimal arthrosis

## 2023-02-08 ENCOUNTER — HOSPITAL ENCOUNTER (EMERGENCY)
Facility: HOSPITAL | Age: 78
Discharge: HOME | End: 2023-02-08
Attending: EMERGENCY MEDICINE
Payer: MEDICARE

## 2023-02-08 VITALS
WEIGHT: 220 LBS | HEIGHT: 63 IN | SYSTOLIC BLOOD PRESSURE: 147 MMHG | RESPIRATION RATE: 20 BRPM | TEMPERATURE: 99 F | BODY MASS INDEX: 38.98 KG/M2 | HEART RATE: 88 BPM | DIASTOLIC BLOOD PRESSURE: 92 MMHG | OXYGEN SATURATION: 98 %

## 2023-02-08 DIAGNOSIS — U07.1 COVID-19 VIRUS INFECTION: Primary | ICD-10-CM

## 2023-02-08 DIAGNOSIS — U07.1 COVID-19 VIRUS DETECTED: ICD-10-CM

## 2023-02-08 LAB
BILIRUB UR QL STRIP: NEGATIVE
CLARITY UR: CLEAR
COLOR UR: YELLOW
GLUCOSE UR QL STRIP: NEGATIVE
HGB UR QL STRIP: ABNORMAL
INFLUENZA A, MOLECULAR: NEGATIVE
INFLUENZA B, MOLECULAR: NEGATIVE
KETONES UR QL STRIP: NEGATIVE
LEUKOCYTE ESTERASE UR QL STRIP: NEGATIVE
MICROSCOPIC COMMENT: ABNORMAL
NITRITE UR QL STRIP: NEGATIVE
PH UR STRIP: 7 [PH] (ref 5–8)
PROT UR QL STRIP: NEGATIVE
RBC #/AREA URNS HPF: 10 /HPF (ref 0–4)
SARS-COV-2 RDRP RESP QL NAA+PROBE: POSITIVE
SP GR UR STRIP: 1.01 (ref 1–1.03)
SPECIMEN SOURCE: NORMAL
URN SPEC COLLECT METH UR: ABNORMAL
UROBILINOGEN UR STRIP-ACNC: 1 EU/DL

## 2023-02-08 PROCEDURE — U0002 COVID-19 LAB TEST NON-CDC: HCPCS | Performed by: EMERGENCY MEDICINE

## 2023-02-08 PROCEDURE — 81000 URINALYSIS NONAUTO W/SCOPE: CPT | Performed by: EMERGENCY MEDICINE

## 2023-02-08 PROCEDURE — 87502 INFLUENZA DNA AMP PROBE: CPT | Performed by: EMERGENCY MEDICINE

## 2023-02-08 PROCEDURE — 99282 EMERGENCY DEPT VISIT SF MDM: CPT

## 2023-02-08 NOTE — ED PROVIDER NOTES
Encounter Date: 2/8/2023       History     Chief Complaint   Patient presents with    Chills    Fever    Headache    Cough      Patient reports recent Viral syndrome followed by bronchitis. Now with chills and cough /bodyaches       77-year-old female here from home via private vehicle for evaluation and treatment of chills, myalgias, and occasional nonproductive cough.  No documented fever.  Symptoms started 2 days ago.  She also recently was treated for bronchitis.  Denies any known sick contacts.  She is been taking Tylenol with moderate relief.    Review of patient's allergies indicates:  No Known Allergies  History reviewed. No pertinent past medical history.  History reviewed. No pertinent surgical history.  History reviewed. No pertinent family history.  Social History     Tobacco Use    Smoking status: Never    Smokeless tobacco: Never     Review of Systems   Constitutional:  Positive for chills.   HENT:  Positive for congestion and rhinorrhea. Negative for sore throat and trouble swallowing.    Eyes: Negative.    Respiratory:  Positive for cough. Negative for shortness of breath, wheezing and stridor.    Cardiovascular: Negative.    Gastrointestinal: Negative.    Endocrine: Negative.    Genitourinary: Negative.    Musculoskeletal: Negative.    Allergic/Immunologic: Negative.    Neurological: Negative.    Hematological: Negative.    Psychiatric/Behavioral: Negative.       Physical Exam     Initial Vitals [02/08/23 0645]   BP Pulse Resp Temp SpO2   (!) 147/92 88 20 98.9 °F (37.2 °C) 98 %      MAP       --         Physical Exam    Nursing note and vitals reviewed.  Constitutional: She appears well-developed and well-nourished. No distress.   Pleasant, normal vital signs, no respiratory issues, does not appear ill.   HENT:   Head: Normocephalic and atraumatic.   Nose: Nose normal.   Mouth/Throat: Oropharynx is clear and moist. No oropharyngeal exudate.   Eyes: Conjunctivae and EOM are normal. Pupils are equal,  round, and reactive to light. No scleral icterus.   Neck: Neck supple. No JVD present.   Normal range of motion.  Cardiovascular:  Normal rate, regular rhythm and normal heart sounds.           No murmur heard.  Pulmonary/Chest: Breath sounds normal. No stridor. No respiratory distress. She has no wheezes.   Abdominal: Abdomen is soft. Bowel sounds are normal. She exhibits no distension. There is no abdominal tenderness.   Musculoskeletal:         General: No tenderness or edema. Normal range of motion.      Cervical back: Normal range of motion and neck supple.     Neurological: She is alert and oriented to person, place, and time. She has normal strength. No cranial nerve deficit or sensory deficit. GCS score is 15. GCS eye subscore is 4. GCS verbal subscore is 5. GCS motor subscore is 6.   Skin: Skin is warm and dry. Capillary refill takes less than 2 seconds. No rash noted. No erythema.   Psychiatric: She has a normal mood and affect. Her behavior is normal.       ED Course   Procedures  Labs Reviewed   URINALYSIS, REFLEX TO URINE CULTURE - Abnormal; Notable for the following components:       Result Value    Occult Blood UA 1+ (*)     All other components within normal limits    Narrative:     Preferred Collection Type->Urine, Clean Catch  Specimen Source->Urine   SARS-COV-2 RNA AMPLIFICATION, QUAL - Abnormal; Notable for the following components:    SARS-CoV-2 RNA, Amplification, Qual Positive (*)     All other components within normal limits    Narrative:     Is the patient symptomatic?->Yes   URINALYSIS MICROSCOPIC - Abnormal; Notable for the following components:    RBC, UA 10 (*)     All other components within normal limits    Narrative:     Preferred Collection Type->Urine, Clean Catch  Specimen Source->Urine   INFLUENZA A & B BY MOLECULAR          Imaging Results    None          Medications - No data to display  Medical Decision Making:   Differential Diagnosis:   COVID-19, influenza, other viral  illness, pneumonia, etc.  ED Management:  Patient is positive for COVID-19.  Considered Paxlovid treatment, but due to the patient's other medications, she does not qualify for this treatment.  Her symptoms are minor at this point.  She was told that she needs to follow-up with her primary care provider via telephone today, and to return for re-evaluation if she develops shortness of breath or other concerning symptoms.  Discussed quarantine x5 days etc..                        Clinical Impression:   Final diagnoses:  [U07.1] COVID-19 virus infection (Primary)        ED Disposition Condition    Discharge Stable          ED Prescriptions    None       Follow-up Information       Follow up With Specialties Details Why Contact Info    Your primary care doctor  Call today      [facility] Emergency Dept Emergency Medicine  If symptoms worsen [address] [address] [phone]             Dada Pompa MD  02/08/23 0891

## 2023-02-08 NOTE — DISCHARGE INSTRUCTIONS
Take Tylenol for any symptoms.  You can probably use over-the-counter Coricidin HBP for congestion and rhinorrhea.  Unfortunately, because of your other medications, you do not qualify for Paxlovid treatment.  Contact your doctor via telephone today or tomorrow to discuss possible other options.  Stay quarantine for 5 days from symptom onset, and seek medical help if you develop shortness of breath or other concerning symptoms.

## 2023-07-21 DIAGNOSIS — M48.02 SPINAL STENOSIS, CERVICAL REGION: Primary | ICD-10-CM

## 2023-07-21 DIAGNOSIS — M54.12 RADICULOPATHY, CERVICAL REGION: ICD-10-CM

## 2023-07-25 ENCOUNTER — TELEPHONE (OUTPATIENT)
Dept: NEUROSURGERY | Facility: CLINIC | Age: 78
End: 2023-07-25
Payer: MEDICARE

## 2023-07-25 DIAGNOSIS — M47.22 CERVICAL SPONDYLOSIS WITH RADICULOPATHY: Primary | ICD-10-CM

## 2023-07-25 NOTE — TELEPHONE ENCOUNTER
Received referral for Dr. Gorman. I will need the disc from the MRI performed at [facility]. I spoke with the patient to inquire about this, she states she will come drop it off today. After I receive it, I will proceed w scheduling.

## 2023-07-25 NOTE — TELEPHONE ENCOUNTER
Lizbet contacted me about this patient.  She is Lizbet's cousin's mother in law.  I asked Lizbet to have the family bring over the MRI on CD [facility].      On second thought, put her in my schedule next week.  Needs cervical x-rays.

## 2023-08-01 ENCOUNTER — OFFICE VISIT (OUTPATIENT)
Dept: NEUROSURGERY | Facility: CLINIC | Age: 78
End: 2023-08-01
Payer: MEDICARE

## 2023-08-01 ENCOUNTER — HOSPITAL ENCOUNTER (OUTPATIENT)
Dept: RADIOLOGY | Facility: HOSPITAL | Age: 78
Discharge: HOME | End: 2023-08-01
Attending: PHYSICIAN ASSISTANT
Payer: MEDICARE

## 2023-08-01 VITALS
HEIGHT: 63 IN | WEIGHT: 230.63 LBS | SYSTOLIC BLOOD PRESSURE: 142 MMHG | BODY MASS INDEX: 40.86 KG/M2 | HEART RATE: 54 BPM | DIASTOLIC BLOOD PRESSURE: 67 MMHG | RESPIRATION RATE: 16 BRPM

## 2023-08-01 DIAGNOSIS — M47.22 CERVICAL SPONDYLOSIS WITH RADICULOPATHY: Primary | ICD-10-CM

## 2023-08-01 DIAGNOSIS — M48.02 SPINAL STENOSIS, CERVICAL REGION: ICD-10-CM

## 2023-08-01 DIAGNOSIS — M54.12 RADICULOPATHY, CERVICAL REGION: ICD-10-CM

## 2023-08-01 DIAGNOSIS — M47.22 CERVICAL SPONDYLOSIS WITH RADICULOPATHY: ICD-10-CM

## 2023-08-01 PROCEDURE — 72052 X-RAY EXAM NECK SPINE 6/>VWS: CPT | Mod: TC

## 2023-08-01 PROCEDURE — 99204 OFFICE O/P NEW MOD 45 MIN: CPT | Mod: ,,, | Performed by: PHYSICIAN ASSISTANT

## 2023-08-01 PROCEDURE — 99204 PR OFFICE/OUTPT VISIT, NEW, LEVL IV, 45-59 MIN: ICD-10-PCS | Mod: ,,, | Performed by: PHYSICIAN ASSISTANT

## 2023-08-01 RX ORDER — DIAZEPAM 10 MG/1
10 TABLET ORAL
Qty: 2 TABLET | Refills: 2 | Status: ON HOLD | OUTPATIENT
Start: 2023-08-01 | End: 2024-02-20 | Stop reason: HOSPADM

## 2023-08-01 RX ORDER — CITALOPRAM 20 MG/1
1 TABLET, FILM COATED ORAL
COMMUNITY
End: 2024-01-03

## 2023-08-01 RX ORDER — ISOSORBIDE MONONITRATE 30 MG/1
30 TABLET, EXTENDED RELEASE ORAL
COMMUNITY
Start: 2022-01-14 | End: 2023-08-01

## 2023-08-01 RX ORDER — ALPRAZOLAM 1 MG/1
1 TABLET ORAL DAILY PRN
COMMUNITY
Start: 2023-06-15 | End: 2023-08-01

## 2023-08-01 RX ORDER — LOSARTAN POTASSIUM 100 MG/1
1 TABLET ORAL DAILY
COMMUNITY

## 2023-08-01 RX ORDER — METOPROLOL SUCCINATE 25 MG/1
1 TABLET, EXTENDED RELEASE ORAL DAILY
COMMUNITY

## 2023-08-01 RX ORDER — DRONEDARONE 400 MG/1
400 TABLET, FILM COATED ORAL 2 TIMES DAILY WITH MEALS
COMMUNITY

## 2023-08-01 RX ORDER — IPRATROPIUM BROMIDE AND ALBUTEROL 20; 100 UG/1; UG/1
SPRAY, METERED RESPIRATORY (INHALATION)
COMMUNITY
End: 2023-08-01

## 2023-08-01 RX ORDER — DOXYCYCLINE HYCLATE 20 MG
20 TABLET ORAL
COMMUNITY
Start: 2022-01-14 | End: 2023-08-01

## 2023-08-01 RX ORDER — OMEPRAZOLE 20 MG/1
1 CAPSULE, DELAYED RELEASE ORAL 2 TIMES DAILY
COMMUNITY

## 2023-08-01 RX ORDER — CALCIUM CARBONATE 600 MG
600 TABLET ORAL 2 TIMES DAILY WITH MEALS
COMMUNITY

## 2023-08-01 RX ORDER — DICYCLOMINE HYDROCHLORIDE 10 MG/1
10 CAPSULE ORAL EVERY 6 HOURS PRN
COMMUNITY
Start: 2023-02-25

## 2023-08-01 RX ORDER — GABAPENTIN 300 MG/1
300 CAPSULE ORAL NIGHTLY
COMMUNITY

## 2023-08-01 RX ORDER — ATORVASTATIN CALCIUM 80 MG/1
80 TABLET, FILM COATED ORAL NIGHTLY
COMMUNITY
Start: 2022-01-14

## 2023-08-01 RX ORDER — SULFAMETHOXAZOLE AND TRIMETHOPRIM 800; 160 MG/1; MG/1
1 TABLET ORAL
COMMUNITY
Start: 2023-05-09 | End: 2023-08-01

## 2023-08-01 RX ORDER — NITROGLYCERIN 0.4 MG/1
TABLET SUBLINGUAL
COMMUNITY

## 2023-08-01 NOTE — PROGRESS NOTES
[facility]  History & Physical  Neurosurgery      Alexa Rodriguez   [id number]   1945       SUBJECTIVE:     CHIEF COMPLAINT:  Neck pain      HPI:  lAexa Rodriguez is a 77 y.o. female who presents for neurosurgical evaluation.  The patient has a long history of neck pain.  The patient has neck pain with pain radiating into bilateral trapezius muscles.  At times, there is pain medial to the inferior angle of the scapula on the left.  She is more concerned about a feeling of heaviness and weakness throughout her entire body.  This has occurred intermittently.  She is a former nurse.  She felt a heaviness and weakness might be due to atrial fibrillation.  However, she has undergone an ablation, and her symptoms have persisted.    The patient notes that she was treated at [facility] in 2008 for dysautonomia.  She was told at that time that she had a bad neck.  They were recommended she wait as long as she could prior to having surgery in her neck.  Conservative measures over time has included multiple courses of physical therapy.  Initially, the therapy helped with her symptoms.  More recently, she would no benefit from the therapy.  She underwent a course of therapy earlier this year which actually increased her symptoms.  She is participate in Pilates for many years.  She feels is helps with her symptoms.  Massage also helps with the neck pain, but only provided short-term relief.  She was seen by Dr. Wells's nurse practitioner.  They did not believe that injections could benefit her.  She was then referred to our office.    Functionally, she feels she does pretty good.  However, she is not able to participate in all activities she desires.  She is able to take care of herself and daily hygiene.  However, she needs help with chores.  She has increased neck pain with chores.  She is experiencing difficulty with dexterity.  She enjoys playing cards and has difficulties shuffling the deck  and dealing the cords.  Her hands cramp from time to time.  She awakens with numbness in the hands as well.  In addition, she has intermittent pain at her left forehead.  She has difficulties with her balance and has fallen a number of times.  She is concerned about worsening her condition with a fall or accident.  She reports to have urinary incontinence which has been a problem for a long time.  She also has difficulty with diarrhea and irritable bowel syndrome      Past Medical History:   Diagnosis Date    Angina pectoris     Arthritis     Atrial fibrillation     History of skin cancer     HTN (hypertension)     Hyperlipidemia     Hyperparathyroidism, unspecified     IBS (irritable bowel syndrome)     ALAINA on CPAP     Spinal stenosis, cervical region     Stomach ulcer     TIA (transient ischemic attack)        Past Surgical History:   Procedure Laterality Date    CATARACT EXTRACTION      CLOSURE OF DEFECT OF MOHS PROCEDURE      CRYOABLATION      HYSTERECTOMY      KNEE ARTHROSCOPY Right     ORIF LEFT ANKLE      TONSILLECTOMY         Family History   Problem Relation Age of Onset    Lung cancer Mother     Colon cancer Father     Skin cancer Sister        Social History     Socioeconomic History    Marital status:    Tobacco Use    Smoking status: Never    Smokeless tobacco: Never   Substance and Sexual Activity    Alcohol use: Yes     Comment: occasionally    Drug use: Never        Review of patient's allergies indicates:   Allergen Reactions    Morphine Itching    Codeine Nausea And Vomiting    Meperidine Nausea And Vomiting     Other reaction(s): vomiting        Current Outpatient Medications   Medication Instructions    apixaban (ELIQUIS) 5 mg Tab No dose, route, or frequency recorded.    atorvastatin (LIPITOR) 80 MG tablet (Prior Auth: Rx Ref#:[id number])    calcium carbonate (OS-ABHAY) 600 mg, Oral    citalopram (CELEXA) 20 MG tablet 1 tablet    dicyclomine (BENTYL) 10 mg, Oral, Every 6 hours PRN     "dronedarone (MULTAQ) 400 mg Tab No dose, route, or frequency recorded.    gabapentin (NEURONTIN) 300 MG capsule Nightly    L.acid,gas,plan,rhm/B.ani/cran (UP4 PROBIOTICS WOMEN'S ORAL) Oral    losartan (COZAAR) 100 MG tablet 1 tablet    metoprolol succinate (TOPROL-XL) 25 MG 24 hr tablet 1 tablet    nitroGLYCERIN (NITROSTAT) 0.4 MG SL tablet No dose, route, or frequency recorded.    omeprazole (PRILOSEC) 20 MG capsule 1 capsule, Oral, 2 times daily        Review of Systems:    Review of Systems   Constitutional:  Negative for chills and fever.   HENT:  Negative for nosebleeds and sore throat.    Eyes:  Negative for pain and visual disturbance.   Respiratory:  Negative for cough, chest tightness and shortness of breath.    Cardiovascular:  Negative for chest pain.   Gastrointestinal:  Negative for diarrhea, nausea and vomiting.   Genitourinary:  Negative for difficulty urinating, dysuria and hematuria.   Musculoskeletal:  Negative for gait problem and myalgias.   Skin:  Negative for rash.   Neurological:  Positive for weakness and headaches. Negative for dizziness and facial asymmetry.   Psychiatric/Behavioral:  Negative for confusion and sleep disturbance. The patient is not nervous/anxious.        OBJECTIVE:       Visit Vitals  BP (!) 142/67   Pulse (!) 54   Resp 16   Ht 5' 3" (1.6 m)   Wt 104.6 kg (230 lb 9.6 oz)   BMI 40.85 kg/m²        General:  Pleasant, Well-nourished, Well-groomed.    CV:  Neck is supple.  There are no carotid bruits.  Heart has regular rate and rhythm.    Lungs:  Lungs are clear to auscultation bilaterally with non-labored respirations.    Abdomen:  Soft, non-tender, non-distended.    Musculoskeletal:  Cervical ROM:  Mildly limited with extension and bilateral rotation.  Tinel's is negative at bilateral wrist and elbows.  Spurling's maneuver is negative bilaterally.    Neurological:    Alert and oriented to person, place, time, and situation.  Muscle strength against resistance:  Strength  " Deltoids Triceps Biceps Wrist Extension Wrist Flexion Intrinsics   Upper: R 5/5 5/5 5/5 5/5  5/5    L 5/5 5/5 5/5 5/5  5/5   Sensation is intact to primary modalities in bilateral upper extremities with the exception of being diminished in the median and ulnar distributions bilaterally.  Reflexes:   Right Left   Triceps 2+ 2+   Biceps 2+ 2+   Brachioradialis 2+ 2+   Patellar 2+ 2+   Achilles 2+ 2+   Tovar's sign is negative bilaterally.  Toes are down going to Babinski.  There is no clonus at the ankles.  Gait is normal.       Imaging:  MRI of the cervical spine was obtained on 06/19/2023 on an open unit.  The scan is of poor quality.  She appears to have multilevel central and foraminal stenosis.  However, it is very difficult to adequately assess.  Cervical x-rays were obtained today, 08/01/2023.  This study shows advanced disc degeneration and spondylosis at C4-5, C5-6, and C6-7.  There is retrolisthesis of C4 on 5 and C5 on 6.  This does not change with flexion or extension.    ASSESSMENT:     1. Cervical spondylosis with radiculopathy  Ambulatory referral/consult to Neurology      2. Spinal stenosis, cervical region      MRI Cervical Spine Without Contrast    CT Cervical Spine Without Contrast    diazePAM (VALIUM) 10 MG Tab         PLAN:     The patient is not myelopathic.  However, she does have significant central and foraminal stenosis throughout the cervical spine.  We will obtain a cervical MRI using a closed unit.  We will also obtain cervical CT to fully assess her bony structure.  We will also obtain electrical studies of bilateral upper extremities.  She was provided a prescription for Valium to be used on-call for the MRI scan due to her claustrophobia.  She will return for follow-up with that testing.      A total of 38 minutes was spent face-to-face with the patient during this encounter.  Over half of that time was spent on counseling and coordination of care.  An additional 20+ minutes were used  to review the patient's chart including cervcial MRI images and report, cervical x-ray images, and work on office note.      Leonie Mendez PA-C      Disclaimer:  This note is prepared using voice recognition software and as such is likely to have errors despite attempts at proofreading.

## 2023-08-21 ENCOUNTER — HOSPITAL ENCOUNTER (OUTPATIENT)
Dept: RADIOLOGY | Facility: HOSPITAL | Age: 78
Discharge: HOME | End: 2023-08-21
Attending: PHYSICIAN ASSISTANT
Payer: MEDICARE

## 2023-08-21 DIAGNOSIS — M48.02 SPINAL STENOSIS, CERVICAL REGION: ICD-10-CM

## 2023-08-21 PROCEDURE — 72125 CT NECK SPINE W/O DYE: CPT | Mod: TC

## 2023-08-29 RX ORDER — ISOSORBIDE MONONITRATE 30 MG/1
TABLET, EXTENDED RELEASE ORAL
COMMUNITY
End: 2024-01-03

## 2023-08-30 ENCOUNTER — OFFICE VISIT (OUTPATIENT)
Dept: NEUROSURGERY | Facility: CLINIC | Age: 78
End: 2023-08-30
Payer: MEDICARE

## 2023-08-30 VITALS
SYSTOLIC BLOOD PRESSURE: 150 MMHG | RESPIRATION RATE: 16 BRPM | WEIGHT: 232 LBS | DIASTOLIC BLOOD PRESSURE: 73 MMHG | HEIGHT: 63 IN | HEART RATE: 59 BPM | BODY MASS INDEX: 41.11 KG/M2

## 2023-08-30 DIAGNOSIS — M47.22 CERVICAL SPONDYLOSIS WITH RADICULOPATHY: Primary | ICD-10-CM

## 2023-08-30 DIAGNOSIS — G56.03 BILATERAL CARPAL TUNNEL SYNDROME: ICD-10-CM

## 2023-08-30 PROCEDURE — 99214 PR OFFICE/OUTPT VISIT, EST, LEVL IV, 30-39 MIN: ICD-10-PCS | Mod: ,,, | Performed by: PHYSICIAN ASSISTANT

## 2023-08-30 PROCEDURE — 99214 OFFICE O/P EST MOD 30 MIN: CPT | Mod: ,,, | Performed by: PHYSICIAN ASSISTANT

## 2023-08-30 RX ORDER — CLOBETASOL PROPIONATE 0.5 MG/G
OINTMENT TOPICAL 2 TIMES DAILY PRN
Status: ON HOLD | COMMUNITY
End: 2024-02-20 | Stop reason: HOSPADM

## 2023-08-30 RX ORDER — NYSTATIN 100000 U/G
CREAM TOPICAL 2 TIMES DAILY
COMMUNITY
Start: 2023-08-29 | End: 2024-01-03

## 2023-08-30 NOTE — PROGRESS NOTES
[facility]  History & Physical  Neurosurgery      Alexa Rodriguez   [id number]   1945       SUBJECTIVE:     CHIEF COMPLAINT:  Neck pain      HPI:  Alexa Rodriguez is a 77 y.o. female who presents for follow up appointment with cervical MRI and CT.  She continues with neck pain and pain radiating into the left trapezius muscle.  There is numbness and cramping sensations in both hands.  Her hands are most bothersome with use, for example when dealing cards.      She is most concerned about a feeling of heaviness and weakness throughout her entire body.  This has occurred intermittently.  She is a former nurse.  She felt a heaviness and weakness might be due to atrial fibrillation.  However, she has undergone an ablation, and her symptoms have persisted.     The patient notes that she was treated at [facility] in 2008 for dysautonomia.  She was told at that time that she had a bad neck.  They were recommended she wait as long as she could prior to having surgery in her neck.  Conservative measures over time has included multiple courses of physical therapy.  Initially, the therapy helped with her symptoms.  More recently, she would no benefit from the therapy.  She underwent a course of therapy earlier this year which actually increased her symptoms.  She is participate in Pilates for many years.  She feels is helps with her symptoms.  Massage also helps with the neck pain, but only provided short-term relief.  She was seen by Dr. Wells's nurse practitioner.  They did not believe that injections could benefit her.  She was then referred to our office.     Functionally, she feels she does pretty good.  However, she is not able to participate in all activities she desires.  She is able to take care of herself and daily hygiene.  However, she needs help with chores.  She has increased neck pain with chores.  She is experiencing difficulty with dexterity.  She enjoys playing cards and has  difficulties shuffling the deck and dealing the cords.  Her hands cramp from time to time.  She awakens with numbness in the hands as well.  In addition, she has intermittent pain at her left forehead.  She has difficulties with her balance and has fallen a number of times.  She is concerned about worsening her condition with a fall or accident.  She reports to have urinary incontinence which has been a problem for a long time.  She also has difficulty with diarrhea and irritable bowel syndrome      Past Medical History:   Diagnosis Date    Angina pectoris     Arthritis     Atrial fibrillation     History of skin cancer     HTN (hypertension)     Hyperlipidemia     Hyperparathyroidism, unspecified     IBS (irritable bowel syndrome)     ALAINA on CPAP     Spinal stenosis, cervical region     Stomach ulcer     TIA (transient ischemic attack)        Past Surgical History:   Procedure Laterality Date    CATARACT EXTRACTION      CLOSURE OF DEFECT OF MOHS PROCEDURE      CRYOABLATION      for afib    HYSTERECTOMY      KNEE ARTHROSCOPY Right     ORIF LEFT ANKLE      TONSILLECTOMY         Family History   Problem Relation Age of Onset    Lung cancer Mother     Colon cancer Father     Skin cancer Sister        Social History     Socioeconomic History    Marital status:    Tobacco Use    Smoking status: Never    Smokeless tobacco: Never   Substance and Sexual Activity    Alcohol use: Yes     Comment: occasionally    Drug use: Never       Review of patient's allergies indicates:   Allergen Reactions    Morphine Itching    Codeine Nausea And Vomiting and Other (See Comments)    Meperidine Nausea And Vomiting and Other (See Comments)     Other reaction(s): vomiting        Current Outpatient Medications   Medication Instructions    apixaban (ELIQUIS) 5 mg Tab No dose, route, or frequency recorded.    atorvastatin (LIPITOR) 80 MG tablet (Prior Auth: Rx Ref#:[id number])    calcium carbonate (OS-ABHAY) 600 mg, Oral    citalopram  "(CELEXA) 20 MG tablet 1 tablet    clobetasol 0.05% (TEMOVATE) 0.05 % Oint Topical, 2 times daily PRN    diazePAM (VALIUM) 10 mg, Oral, On Call Procedure    dicyclomine (BENTYL) 10 mg, Oral, Every 6 hours PRN    dronedarone (MULTAQ) 400 mg Tab No dose, route, or frequency recorded.    gabapentin (NEURONTIN) 300 MG capsule Nightly    isosorbide mononitrate (IMDUR) 30 MG 24 hr tablet No dose, route, or frequency recorded.    L.acid,gas,plan,rhm/B.ani/cran (UP4 PROBIOTICS WOMEN'S ORAL) Oral    losartan (COZAAR) 100 MG tablet 1 tablet    metoprolol succinate (TOPROL-XL) 25 MG 24 hr tablet 1 tablet    nitroGLYCERIN (NITROSTAT) 0.4 MG SL tablet No dose, route, or frequency recorded.    nystatin (MYCOSTATIN) cream Topical (Top), 2 times daily    omeprazole (PRILOSEC) 20 MG capsule 1 capsule, Oral, 2 times daily        Review of Systems:     Review of Systems   Constitutional:  Negative for chills and fever.   HENT:  Negative for nosebleeds and sore throat.    Eyes:  Negative for pain and visual disturbance.   Respiratory:  Negative for cough, chest tightness and shortness of breath.    Cardiovascular:  Negative for chest pain.   Gastrointestinal:  Negative for diarrhea, nausea and vomiting.   Genitourinary:  Negative for difficulty urinating, dysuria and hematuria.   Musculoskeletal:  Negative for gait problem and myalgias.   Skin:  Negative for rash.   Neurological:  Positive for weakness and headaches. Negative for dizziness and facial asymmetry.   Psychiatric/Behavioral:  Negative for confusion and sleep disturbance. The patient is not nervous/anxious.        OBJECTIVE:       Visit Vitals  BP (!) 150/73   Pulse (!) 59   Resp 16   Ht 5' 3" (1.6 m)   Wt 105.2 kg (232 lb)   BMI 41.10 kg/m²        General:  Pleasant, Well-groomed, Overweight.    Lungs:  Breathing is quiet with non-labored respirations.    Musculoskeletal:  Cervical ROM:  Mildly limited with extension and bilateral rotation.  Tinel's is negative at bilateral " wrist and elbows.  Spurling's maneuver is negative bilaterally.     Neurological:    Alert and oriented to person, place, time, and situation.  Muscle strength against resistance:  Strength   Deltoids Triceps Biceps Wrist Extension Wrist Flexion Intrinsics   Upper: R 5/5 5/5 5/5 5/5   5/5     L 5/5 5/5 5/5 5/5   5/5   Sensation is intact to primary modalities in bilateral upper extremities with the exception of being diminished in the median and ulnar distributions bilaterally.  Reflexes:    Right Left   Triceps 2+ 2+   Biceps 2+ 2+   Brachioradialis 2+ 2+   Patellar 2+ 2+   Achilles 2+ 2+   Tovar's sign is negative bilaterally.  Toes are down going to Babinski.  There is no clonus at the ankles.  Gait is normal.       Imaging:  All pertinent neuroimaging independently reviewed. Discussed these findings in detail with the patient.      ASSESSMENT:       1. Cervical spondylosis with radiculopathy  Ambulatory referral/consult to Physical/Occupational Therapy    ORTHOTIC DEVICE (DME)      2. Bilateral carpal tunnel syndrome  WRIST BRACE FOR HOME USE    Ambulatory referral/consult to Physical/Occupational Therapy           PLAN:       Options were discussed at length with the patient and her daughter.  The patient has multilevel spondylosis and uncinate/facet hypertrophy which causes varying degrees of moderate to severe foraminal stenosis and mild-to-moderate central stenosis.  There is no cord compression.  There is no indication that her generalized weakness and fatigue would be due to cervical stenosis.  She would like to continue with conservative measures.  She was provided with a prescription for physical therapy, over the door cervical traction, and bilateral wrist splints to be worn at night.  She was provided with instructions in regards to the traction and wrist splints.  She voiced understanding.  She will return for follow-up on an as-needed basis.      A total of 25 minutes was spent face-to-face with  the patient during this encounter.  Over half of that time was spent on counseling and coordination of care.  An additional 12 minutes were used to review the patient's chart including cervical MRI and CT images, and work on office note.      Leonie Mendez PA-C      Disclaimer:  This note is prepared using voice recognition software and as such is likely to have errors despite attempts at proofreading.      89

## 2023-11-15 ENCOUNTER — OFFICE VISIT (OUTPATIENT)
Dept: ORTHOPEDICS | Facility: CLINIC | Age: 78
End: 2023-11-15
Payer: MEDICARE

## 2023-11-15 VITALS — WEIGHT: 232 LBS | BODY MASS INDEX: 41.11 KG/M2 | HEIGHT: 63 IN

## 2023-11-15 DIAGNOSIS — S92.355A NONDISPLACED FRACTURE OF FIFTH METATARSAL BONE, LEFT FOOT, INITIAL ENCOUNTER FOR CLOSED FRACTURE: Primary | ICD-10-CM

## 2023-11-15 PROCEDURE — 28470 CLTX METATARSAL FX WO MNP EA: CPT | Mod: LT,,, | Performed by: NURSE PRACTITIONER

## 2023-11-15 PROCEDURE — 99213 PR OFFICE/OUTPT VISIT, EST, LEVL III, 20-29 MIN: ICD-10-PCS | Mod: 57,,, | Performed by: NURSE PRACTITIONER

## 2023-11-15 PROCEDURE — 28470 PR CLOSED RX METATARSAL FX: ICD-10-PCS | Mod: LT,,, | Performed by: NURSE PRACTITIONER

## 2023-11-15 PROCEDURE — 99213 OFFICE O/P EST LOW 20 MIN: CPT | Mod: 57,,, | Performed by: NURSE PRACTITIONER

## 2023-11-15 NOTE — PROGRESS NOTES
Chief Complaint:   Chief Complaint   Patient presents with    Left Foot - Pain    Pain     Left foot pain after rolling foot on step at grandchild's wedding 11/11/23. ED in [place]- Xrays done. Amb in cast shoe. C/o some discomfort.     mn        Consulting Physician: No ref. provider found    History of present illness:    she  is a pleasant 78 y.o. year old female who presents with left foot pain since 11/11/23 after a fall. She presented to the ED where xrays revealed a 5th metatarsal fracture. She as placed into a hard sole shoe. Today she reports continued pain laterally in the foot. It is worse with increased activity. It is better with rest. She is requesting another boot.     Past Medical History:   Diagnosis Date    Angina pectoris     Arthritis     Atrial fibrillation     History of skin cancer     HTN (hypertension)     Hyperlipidemia     Hyperparathyroidism, unspecified     IBS (irritable bowel syndrome)     ALAINA on CPAP     Spinal stenosis, cervical region     Stomach ulcer     TIA (transient ischemic attack)        Past Surgical History:   Procedure Laterality Date    CATARACT EXTRACTION      CLOSURE OF DEFECT OF MOHS PROCEDURE      CRYOABLATION      for afib    HYSTERECTOMY      KNEE ARTHROSCOPY Right     ORIF LEFT ANKLE      TONSILLECTOMY         Current Outpatient Medications   Medication Sig    apixaban (ELIQUIS) 5 mg Tab     atorvastatin (LIPITOR) 80 MG tablet (Prior Auth: Rx Ref#:[id number])    calcium carbonate (OS-ABHAY) 600 mg calcium (1,500 mg) Tab Take 600 mg by mouth.    clobetasol 0.05% (TEMOVATE) 0.05 % Oint Apply topically 2 (two) times daily as needed.    dicyclomine (BENTYL) 10 MG capsule Take 10 mg by mouth every 6 (six) hours as needed.    dronedarone (MULTAQ) 400 mg Tab     gabapentin (NEURONTIN) 300 MG capsule nightly.    L.acid,gas,plan,rhm/B.ani/cran (UP4 PROBIOTICS WOMEN'S ORAL) Take by mouth.    losartan (COZAAR) 100 MG tablet 1 tablet.    metoprolol succinate (TOPROL-XL) 25 MG  "24 hr tablet 1 tablet.    nitroGLYCERIN (NITROSTAT) 0.4 MG SL tablet     omeprazole (PRILOSEC) 20 MG capsule Take 1 capsule by mouth 2 (two) times daily.    citalopram (CELEXA) 20 MG tablet 1 tablet.    diazePAM (VALIUM) 10 MG Tab Take 1 tablet (10 mg total) by mouth On call Procedure (anxiety for MRI).    isosorbide mononitrate (IMDUR) 30 MG 24 hr tablet     nystatin (MYCOSTATIN) cream Apply topically 2 (two) times daily.     No current facility-administered medications for this visit.       Review of patient's allergies indicates:   Allergen Reactions    Morphine Itching    Codeine Nausea And Vomiting and Other (See Comments)    Meperidine Nausea And Vomiting and Other (See Comments)     Other reaction(s): vomiting       Family History   Problem Relation Age of Onset    Lung cancer Mother     Colon cancer Father     Skin cancer Sister        Social History     Socioeconomic History    Marital status:    Tobacco Use    Smoking status: Never    Smokeless tobacco: Never   Substance and Sexual Activity    Alcohol use: Yes     Comment: occasionally    Drug use: Never       Review of Systems:    Constitution:   Denies chills, fever, and sweats.  HENT:   Denies headaches or blurry vision.  Cardiovascular:  Denies chest pain or irregular heart beat.  Respiratory:   Denies cough or shortness of breath.  Gastrointestinal:  Denies abdominal pain, nausea, or vomiting.  Musculoskeletal:   Denies muscle cramps.  Neurological:   Denies dizziness or focal weakness.  Psychiatric/Behavior: Normal mental status.  Hematology/Lymph:  Denies bleeding problem or easy bruising/bleeding.  Skin:    Denies rash or suspicious lesions.    Examination:    Vital Signs:    Vitals:    11/15/23 1512   Weight: 105.2 kg (232 lb)   Height: 5' 2.99" (1.6 m)   PainSc:   4       Body mass index is 41.11 kg/m².    Constitution:   Well-developed, well nourished patient in no acute distress.  Neurological:   Alert and oriented x 3 and cooperative to " examination.     Psychiatric/Behavior: Normal mental status.  Respiratory:   No shortness of breath.  Cards:    Pulses palpable and symmetric, brisk cap refill   Eyes:    Extraoccular muscles intact  Skin:    No scars, rash or suspicious lesions.    MSK:   Left foot without obvious deformity.  Tenderness to palpation laterally over the 5th metatarsal.  Range of motion of foot and ankle full.  Minimal swelling.  No erythema or bruising.    Imaging:  Previous x-rays reviewed which show left 5th metatarsal fracture       Assessment: Nondisplaced fracture of fifth metatarsal bone, left foot, initial encounter for closed fracture        Plan:  We will pursue nonoperative treatment. We will transition her into a boot today.  She can weightbear on the heel in the boot.  Follow up in 3 weeks with repeat x-rays of the left foot

## 2023-12-13 ENCOUNTER — OFFICE VISIT (OUTPATIENT)
Dept: ORTHOPEDICS | Facility: CLINIC | Age: 78
End: 2023-12-13
Payer: MEDICARE

## 2023-12-13 ENCOUNTER — HOSPITAL ENCOUNTER (OUTPATIENT)
Dept: RADIOLOGY | Facility: CLINIC | Age: 78
Discharge: HOME | End: 2023-12-13
Attending: ORTHOPAEDIC SURGERY
Payer: MEDICARE

## 2023-12-13 VITALS
DIASTOLIC BLOOD PRESSURE: 73 MMHG | HEIGHT: 63 IN | SYSTOLIC BLOOD PRESSURE: 142 MMHG | BODY MASS INDEX: 41.11 KG/M2 | HEART RATE: 70 BPM | WEIGHT: 232 LBS

## 2023-12-13 DIAGNOSIS — S92.355D CLOSED NONDISPLACED FRACTURE OF FIFTH METATARSAL BONE OF LEFT FOOT WITH ROUTINE HEALING, SUBSEQUENT ENCOUNTER: Primary | ICD-10-CM

## 2023-12-13 DIAGNOSIS — S92.355A NONDISPLACED FRACTURE OF FIFTH METATARSAL BONE, LEFT FOOT, INITIAL ENCOUNTER FOR CLOSED FRACTURE: ICD-10-CM

## 2023-12-13 PROCEDURE — 99024 POSTOP FOLLOW-UP VISIT: CPT | Mod: POP,,, | Performed by: NURSE PRACTITIONER

## 2023-12-13 PROCEDURE — 99024 PR POST-OP FOLLOW-UP VISIT: ICD-10-PCS | Mod: POP,,, | Performed by: NURSE PRACTITIONER

## 2023-12-13 PROCEDURE — 73630 XR FOOT COMPLETE 3 VIEW LEFT: ICD-10-PCS | Mod: LT,,, | Performed by: ORTHOPAEDIC SURGERY

## 2023-12-13 PROCEDURE — 73630 X-RAY EXAM OF FOOT: CPT | Mod: LT,,, | Performed by: ORTHOPAEDIC SURGERY

## 2023-12-13 NOTE — PROGRESS NOTES
Chief Complaint:   Chief Complaint   Patient presents with    Left Foot - Fracture    Fracture     3 week flu LT foot fracture w/ xrays. Doing great c/o no pain. Wearing tennis shoes. DOI: 11/11/23.       History of present illness:  11/11/23: Left 5th metatarsal fracture    She returns today. Her pain is improved. She weaned from the boot and is ambulating in shoes.     Musculoskeletal:   Left foot without obvious deformity. Nontender to palpation. ROM foot and ankle full without pain     Imaging: X-rays ordered and images interpreted today personally by me of 3 views of the left foot show stable fracture alignment with interval fracture healing.        Assessment: Closed nondisplaced fracture of fifth metatarsal bone of left foot with routine healing, subsequent encounter  -     X-Ray Foot Complete Left; Future; Expected date: 12/13/2023        Plan:  Doing well s/p above. Ok to continue ambulating in her shoe. Follow up in 3 weeks with repeat xrays of the left foot.

## 2024-01-03 ENCOUNTER — LAB VISIT (OUTPATIENT)
Dept: LAB | Facility: HOSPITAL | Age: 79
End: 2024-01-03
Attending: ORTHOPAEDIC SURGERY
Payer: MEDICARE

## 2024-01-03 ENCOUNTER — OFFICE VISIT (OUTPATIENT)
Dept: ORTHOPEDICS | Facility: CLINIC | Age: 79
End: 2024-01-03
Payer: MEDICARE

## 2024-01-03 ENCOUNTER — HOSPITAL ENCOUNTER (OUTPATIENT)
Dept: RADIOLOGY | Facility: CLINIC | Age: 79
Discharge: HOME | End: 2024-01-03
Attending: ORTHOPAEDIC SURGERY
Payer: MEDICARE

## 2024-01-03 VITALS
BODY MASS INDEX: 40.75 KG/M2 | SYSTOLIC BLOOD PRESSURE: 125 MMHG | HEART RATE: 66 BPM | DIASTOLIC BLOOD PRESSURE: 73 MMHG | WEIGHT: 230 LBS | HEIGHT: 63 IN

## 2024-01-03 DIAGNOSIS — Z01.818 PRE-OP TESTING: ICD-10-CM

## 2024-01-03 DIAGNOSIS — G56.02 CARPAL TUNNEL SYNDROME OF LEFT WRIST: ICD-10-CM

## 2024-01-03 DIAGNOSIS — S92.355D CLOSED NONDISPLACED FRACTURE OF FIFTH METATARSAL BONE OF LEFT FOOT WITH ROUTINE HEALING, SUBSEQUENT ENCOUNTER: ICD-10-CM

## 2024-01-03 DIAGNOSIS — M65.342 TRIGGER RING FINGER OF LEFT HAND: ICD-10-CM

## 2024-01-03 DIAGNOSIS — S92.355D CLOSED NONDISPLACED FRACTURE OF FIFTH METATARSAL BONE OF LEFT FOOT WITH ROUTINE HEALING, SUBSEQUENT ENCOUNTER: Primary | ICD-10-CM

## 2024-01-03 DIAGNOSIS — M65.312 TRIGGER FINGER OF LEFT THUMB: ICD-10-CM

## 2024-01-03 LAB
ANION GAP SERPL CALC-SCNC: 5 MEQ/L
BUN SERPL-MCNC: 16.9 MG/DL (ref 9.8–20.1)
CALCIUM SERPL-MCNC: 8.8 MG/DL (ref 8.4–10.2)
CHLORIDE SERPL-SCNC: 107 MMOL/L (ref 98–107)
CO2 SERPL-SCNC: 27 MMOL/L (ref 23–31)
CREAT SERPL-MCNC: 1.46 MG/DL (ref 0.55–1.02)
CREAT/UREA NIT SERPL: 12
GFR SERPLBLD CREATININE-BSD FMLA CKD-EPI: 37 MLS/MIN/1.73/M2
GLUCOSE SERPL-MCNC: 102 MG/DL (ref 82–115)
POTASSIUM SERPL-SCNC: 4.3 MMOL/L (ref 3.5–5.1)
SODIUM SERPL-SCNC: 139 MMOL/L (ref 136–145)

## 2024-01-03 PROCEDURE — 73630 X-RAY EXAM OF FOOT: CPT | Mod: LT,,, | Performed by: ORTHOPAEDIC SURGERY

## 2024-01-03 PROCEDURE — 80048 BASIC METABOLIC PNL TOTAL CA: CPT

## 2024-01-03 PROCEDURE — 99024 POSTOP FOLLOW-UP VISIT: CPT | Mod: POP,,, | Performed by: ORTHOPAEDIC SURGERY

## 2024-01-03 PROCEDURE — 36415 COLL VENOUS BLD VENIPUNCTURE: CPT

## 2024-01-03 NOTE — PROGRESS NOTES
Chief Complaint:   Chief Complaint   Patient presents with    Left Foot - Pain    Foot Pain     F/u for left foot fx DOI 11/11/23, states that her foot is feeling better and is ambulating in normal shoes.        History of present illness:  11/11/23: Left 5th metatarsal fracture    She returns today. She denies much pain. Overall doing well.     She does c/o longstanding left trigger thumb that is worsening as well as ring finger triggering. She has left carpal tunnel confirmed with EMG. She has numbness/tingling in all fingers that is worse with increased activity and at night. She has tried a brace without relief    Musculoskeletal:   Left foot nontender to palpation. Rom full.   Left thumb and ring finger triggering. TTP at A1 pulley. ROM full. Without erythema or swelling. SILT all fingers. + carpal tunnel compression.    Imaging: X-rays ordered and images interpreted today personally by me of 3 views of the left foot show stable fracture alignment with interval fracture healing.        Assessment: Closed nondisplaced fracture of fifth metatarsal bone of left foot with routine healing, subsequent encounter  -     X-Ray Foot Complete Left; Future; Expected date: 01/03/2024    Pre-op testing  -     EKG 12-lead; Future  -     Basic Metabolic Panel; Future; Expected date: 01/03/2024    Carpal tunnel syndrome of left wrist    Trigger finger of left thumb    Trigger ring finger of left hand        Plan:  Doing well s/p above. Ok to continue ambulating in her shoe. Follow up in 6 weeks with repeat xrays of the left foot. I've recommended surgical intervention: left carpal tunnel release, left triggerthumb and ring finger trigger release. We discussed the details of the procedure and the expected postoperative course.  We discussed the benefits of surgery which would be decreased pain and increased function.  We discussed the risks of surgery which is small but could be significant if she has continued pain, stiffness,  or infection. After discussion she would like to proceed. Will plan a surgical date after obtaining cardiac clearance.      Wally Tavares MD personally performed the services described in this documentation, including but not limited to patient's history, physical examination, and assessment and plan of care. All medical record entries made by MELISSA Salvador were performed at his direction and in his presence. The medical record was reviewed and is accurate and complete.

## 2024-02-05 DIAGNOSIS — M65.342 TRIGGER RING FINGER OF LEFT HAND: ICD-10-CM

## 2024-02-05 DIAGNOSIS — G56.02 CARPAL TUNNEL SYNDROME, LEFT: ICD-10-CM

## 2024-02-05 DIAGNOSIS — G56.02 CARPAL TUNNEL SYNDROME OF LEFT WRIST: Primary | ICD-10-CM

## 2024-02-05 DIAGNOSIS — M65.312 TRIGGER FINGER OF LEFT THUMB: ICD-10-CM

## 2024-02-05 RX ORDER — SODIUM CHLORIDE 9 MG/ML
INJECTION, SOLUTION INTRAVENOUS CONTINUOUS
Status: CANCELLED | OUTPATIENT
Start: 2024-02-05

## 2024-02-12 ENCOUNTER — ANESTHESIA EVENT (OUTPATIENT)
Dept: SURGERY | Facility: HOSPITAL | Age: 79
End: 2024-02-12
Payer: MEDICARE

## 2024-02-14 ENCOUNTER — HOSPITAL ENCOUNTER (OUTPATIENT)
Dept: RADIOLOGY | Facility: CLINIC | Age: 79
Discharge: HOME | End: 2024-02-14
Attending: NURSE PRACTITIONER
Payer: MEDICARE

## 2024-02-14 ENCOUNTER — OFFICE VISIT (OUTPATIENT)
Dept: ORTHOPEDICS | Facility: CLINIC | Age: 79
End: 2024-02-14
Payer: MEDICARE

## 2024-02-14 VITALS
DIASTOLIC BLOOD PRESSURE: 66 MMHG | WEIGHT: 230 LBS | SYSTOLIC BLOOD PRESSURE: 152 MMHG | HEIGHT: 63 IN | BODY MASS INDEX: 40.75 KG/M2 | HEART RATE: 68 BPM

## 2024-02-14 DIAGNOSIS — S92.355D CLOSED NONDISPLACED FRACTURE OF FIFTH METATARSAL BONE OF LEFT FOOT WITH ROUTINE HEALING, SUBSEQUENT ENCOUNTER: ICD-10-CM

## 2024-02-14 DIAGNOSIS — S92.355D CLOSED NONDISPLACED FRACTURE OF FIFTH METATARSAL BONE OF LEFT FOOT WITH ROUTINE HEALING, SUBSEQUENT ENCOUNTER: Primary | ICD-10-CM

## 2024-02-14 PROCEDURE — 73630 X-RAY EXAM OF FOOT: CPT | Mod: LT,,, | Performed by: NURSE PRACTITIONER

## 2024-02-14 PROCEDURE — 99213 OFFICE O/P EST LOW 20 MIN: CPT | Mod: ,,, | Performed by: NURSE PRACTITIONER

## 2024-02-14 NOTE — PROGRESS NOTES
Chief Complaint:   Chief Complaint   Patient presents with    Left Foot - Follow-up    Follow-up     3 month flu left foot fracture with x-rays. Here for follow up, doing great wearing normal shoes. C/o no pain.   States BP fluctuates. Phuong is monitoring. BP was taken on forearm per patient request.        Consulting Physician: No ref. provider found    History of present illness:  11/11/23: Left 5th metatarsal fracture     She returns today.  Her left foot is doing well.  She is ambulating in normal shoes.  She denies pain.    Past Medical History:   Diagnosis Date    Angina pectoris     Arthritis     Atrial fibrillation     History of skin cancer     HTN (hypertension)     Hyperlipidemia     Hyperparathyroidism, unspecified     IBS (irritable bowel syndrome)     ALAINA on CPAP     Spinal stenosis, cervical region     Stomach ulcer     TIA (transient ischemic attack)        Past Surgical History:   Procedure Laterality Date    CATARACT EXTRACTION W/ INTRAOCULAR LENS IMPLANT Bilateral     Cataracts    CLOSURE OF DEFECT OF MOHS PROCEDURE      CRYOABLATION      for afib    HYSTERECTOMY      KNEE ARTHROSCOPY Right     ORIF LEFT ANKLE      TONSILLECTOMY         Current Outpatient Medications   Medication Sig    apixaban (ELIQUIS) 5 mg Tab Take 5 mg by mouth 2 (two) times daily.    atorvastatin (LIPITOR) 80 MG tablet Take 80 mg by mouth every evening.    calcium carbonate (OS-ABHAY) 600 mg calcium (1,500 mg) Tab Take 600 mg by mouth 2 (two) times daily with meals.    clobetasol 0.05% (TEMOVATE) 0.05 % Oint Apply topically 2 (two) times daily as needed.    dicyclomine (BENTYL) 10 MG capsule Take 10 mg by mouth every 6 (six) hours as needed.    dronedarone (MULTAQ) 400 mg Tab Take 400 mg by mouth 2 (two) times daily with meals.    gabapentin (NEURONTIN) 300 MG capsule Take 300 mg by mouth nightly.    L.acid,gas,plan,rhm/B.ani/cran (UP4 PROBIOTICS WOMEN'S ORAL) Take 1 tablet by mouth once daily.    losartan (COZAAR) 100 MG  tablet Take 1 tablet by mouth once daily.    metoprolol succinate (TOPROL-XL) 25 MG 24 hr tablet Take 1 tablet by mouth once daily.    nitroGLYCERIN (NITROSTAT) 0.4 MG SL tablet     omeprazole (PRILOSEC) 20 MG capsule Take 1 capsule by mouth 2 (two) times daily.    diazePAM (VALIUM) 10 MG Tab Take 1 tablet (10 mg total) by mouth On call Procedure (anxiety for MRI).     No current facility-administered medications for this visit.       Review of patient's allergies indicates:   Allergen Reactions    Morphine Itching    Codeine Nausea And Vomiting and Other (See Comments)    Meperidine Nausea And Vomiting and Other (See Comments)     Other reaction(s): vomiting       Family History   Problem Relation Age of Onset    Lung cancer Mother     Arthritis Mother     COPD Mother     Hearing loss Mother     Colon cancer Father     Depression Father     Heart disease Father     Skin cancer Sister     Hyperlipidemia Sister     Cancer Sister        Social History     Socioeconomic History    Marital status:    Tobacco Use    Smoking status: Never    Smokeless tobacco: Never    Tobacco comments:     None   Substance and Sexual Activity    Alcohol use: Yes     Alcohol/week: 1.0 standard drink of alcohol     Types: 1 Glasses of wine per week     Comment: Varies with occaaions    Drug use: Never    Sexual activity: Never       Review of Systems:    Constitution:   Denies chills, fever, and sweats.  HENT:   Denies headaches or blurry vision.  Cardiovascular:  Denies chest pain or irregular heart beat.  Respiratory:   Denies cough or shortness of breath.  Gastrointestinal:  Denies abdominal pain, nausea, or vomiting.  Musculoskeletal:   Denies muscle cramps.  Neurological:   Denies dizziness or focal weakness.  Psychiatric/Behavior: Normal mental status.  Hematology/Lymph:  Denies bleeding problem or easy bruising/bleeding.  Skin:    Denies rash or suspicious lesions.    Examination:    Vital Signs:    Vitals:    02/14/24 0913  "  BP: (!) 152/66   Pulse: 68   Weight: 104.3 kg (230 lb)   Height: 5' 2.99" (1.6 m)       Body mass index is 40.75 kg/m².    Constitution:   Well-developed, well nourished patient in no acute distress.  Neurological:   Alert and oriented x 3 and cooperative to examination.     Psychiatric/Behavior: Normal mental status.  Respiratory:   No shortness of breath.  Cards:    Pulses palpable and symmetric, brisk cap refill   Eyes:    Extraoccular muscles intact  Skin:    No scars, rash or suspicious lesions.    MSK:   Left foot without obvious deformity.  Without swelling, erythema, or bruising.  Nontender to palpation.  Range of motion full without pain. Normal gait    Imaging: X-rays ordered and images interpreted today personally by me of three views of the left foot show stable fracture alignment with new callus formation       Assessment: Closed nondisplaced fracture of fifth metatarsal bone of left foot with routine healing, subsequent encounter  -     X-Ray Foot Complete Left; Future; Expected date: 02/14/2024        Plan:  Doing well status post above.  She denies pain or issues.  Activities as tolerated.  She can follow up if she has any issues  "

## 2024-02-15 NOTE — H&P
Chief Complaint:        Chief Complaint   Patient presents with    Left Foot - Pain    Foot Pain       F/u for left foot fx DOI 11/11/23, states that her foot is feeling better and is ambulating in normal shoes.          History of present illness:  11/11/23: Left 5th metatarsal fracture     She returns today. She denies much pain. Overall doing well.      She does c/o longstanding left trigger thumb that is worsening as well as ring finger triggering. She has left carpal tunnel confirmed with EMG. She has numbness/tingling in all fingers that is worse with increased activity and at night. She has tried a brace without relief     Musculoskeletal:   Left foot nontender to palpation. Rom full.   Left thumb and ring finger triggering. TTP at A1 pulley. ROM full. Without erythema or swelling. SILT all fingers. + carpal tunnel compression.     Imaging: X-rays ordered and images interpreted today personally by me of 3 views of the left foot show stable fracture alignment with interval fracture healing.         Assessment: Closed nondisplaced fracture of fifth metatarsal bone of left foot with routine healing, subsequent encounter  -     X-Ray Foot Complete Left; Future; Expected date: 01/03/2024     Pre-op testing  -     EKG 12-lead; Future  -     Basic Metabolic Panel; Future; Expected date: 01/03/2024     Carpal tunnel syndrome of left wrist     Trigger finger of left thumb     Trigger ring finger of left hand           Plan:  Doing well s/p above. Ok to continue ambulating in her shoe. Follow up in 6 weeks with repeat xrays of the left foot. I've recommended surgical intervention: left carpal tunnel release, left triggerthumb and ring finger trigger release. We discussed the details of the procedure and the expected postoperative course.  We discussed the benefits of surgery which would be decreased pain and increased function.  We discussed the risks of surgery which is small but could be significant if she has  continued pain, stiffness, or infection. After discussion she would like to proceed. Plans for surgery February 20

## 2024-02-19 RX ORDER — ONDANSETRON HYDROCHLORIDE 2 MG/ML
4 INJECTION, SOLUTION INTRAVENOUS DAILY PRN
Status: CANCELLED | OUTPATIENT
Start: 2024-02-19

## 2024-02-19 RX ORDER — PROCHLORPERAZINE EDISYLATE 5 MG/ML
5 INJECTION INTRAMUSCULAR; INTRAVENOUS EVERY 30 MIN PRN
Status: CANCELLED | OUTPATIENT
Start: 2024-02-19

## 2024-02-19 RX ORDER — HYDROCODONE BITARTRATE AND ACETAMINOPHEN 5; 325 MG/1; MG/1
1 TABLET ORAL
Status: CANCELLED | OUTPATIENT
Start: 2024-02-19

## 2024-02-19 RX ORDER — SODIUM CHLORIDE, SODIUM LACTATE, POTASSIUM CHLORIDE, CALCIUM CHLORIDE 600; 310; 30; 20 MG/100ML; MG/100ML; MG/100ML; MG/100ML
1000 INJECTION, SOLUTION INTRAVENOUS ONCE
Status: CANCELLED | OUTPATIENT
Start: 2024-02-19 | End: 2024-02-19

## 2024-02-19 RX ORDER — HYDROMORPHONE HYDROCHLORIDE 2 MG/ML
0.4 INJECTION, SOLUTION INTRAMUSCULAR; INTRAVENOUS; SUBCUTANEOUS EVERY 5 MIN PRN
Status: CANCELLED | OUTPATIENT
Start: 2024-02-19

## 2024-02-19 NOTE — ANESTHESIA PREPROCEDURE EVALUATION
02/19/2024  Alexa Rodriguez is a 78 y.o., female with ----------------------------  Angina pectoris  Arthritis  Atrial fibrillation  History of skin cancer  HTN (hypertension)  Hyperlipidemia  Hyperparathyroidism, unspecified  IBS (irritable bowel syndrome)  ALAINA on CPAP  Spinal stenosis, cervical region  Stomach ulcer  TIA (transient ischemic attack)  Morbid Obesity with BMI 41    And ----------------------------  Cataract extraction w/ intraocular lens implant      Comment:  Cataracts  Closure of defect of mohs procedure  Cryoablation      Comment:  for afib  Hysterectomy  Knee arthroscopy  Orif left ankle  Tonsillectomy    Presents for left CTR      Pre-op Assessment    I have reviewed the NPO Status.      Review of Systems  Cardiovascular:     Hypertension      Angina           Cardiovascular Symptoms: Angina                       Hypertension     Atrial Fibrillation     Pulmonary:        Sleep Apnea     Obstructive Sleep Apnea (ALAINA).           Hepatic/GI:   PUD,        Peptic Ulcer Disease          Neurological:  TIA,                         TIA - Transient Ischemic Attack                   Physical Exam  General: Well nourished, Cooperative, Alert and Oriented    Airway:  Mallampati: III   Mouth Opening: Normal  TM Distance: Normal  Tongue: Normal  Neck ROM: Normal ROM  Neck: Girth Increased    Dental:  Intact    Chest/Lungs:  Clear to auscultation, Normal Respiratory Rate    Heart:  Rate: Normal  Rhythm: Regular Rhythm        Anesthesia Plan  Type of Anesthesia, risks & benefits discussed:    Anesthesia Type: Regional  Intra-op Monitoring Plan: Standard ASA Monitors  Post Op Pain Control Plan: multimodal analgesia, peripheral nerve block and IV/PO Opioids PRN  Induction:  IV  Informed Consent: Informed consent signed with the Patient and all parties understand the risks and agree with anesthesia plan.   All questions answered.   ASA Score: 3  Day of Surgery Review of History & Physical: H&P Update referred to the surgeon/provider.  Anesthesia Plan Notes: Premedication: Midazolam  Regional: Supraclavicular vs interscalene nerve block for surgical anesthesia - Ropiv 0.5% 30mls  Special Technique: Preemptive analgesia with neurontin, zofran, acetaminophen and celebrex or tramadol  Nasal Cannula vs O2 via facemask- consider Supernova Nasal CPAP for obese or ALAINA patients  PONV prophylaxis  Pt may go directly to Phase 2 if criteria met    Ready For Surgery From Anesthesia Perspective.     .

## 2024-02-20 ENCOUNTER — ANESTHESIA (OUTPATIENT)
Dept: SURGERY | Facility: HOSPITAL | Age: 79
End: 2024-02-20
Payer: MEDICARE

## 2024-02-20 ENCOUNTER — HOSPITAL ENCOUNTER (OUTPATIENT)
Facility: HOSPITAL | Age: 79
Discharge: HOME | End: 2024-02-20
Attending: ORTHOPAEDIC SURGERY | Admitting: ORTHOPAEDIC SURGERY
Payer: MEDICARE

## 2024-02-20 DIAGNOSIS — M65.312 TRIGGER FINGER OF LEFT THUMB: ICD-10-CM

## 2024-02-20 DIAGNOSIS — G56.02 CARPAL TUNNEL SYNDROME OF LEFT WRIST: Primary | ICD-10-CM

## 2024-02-20 DIAGNOSIS — G56.02 CARPAL TUNNEL SYNDROME, LEFT: ICD-10-CM

## 2024-02-20 DIAGNOSIS — M65.342 TRIGGER RING FINGER OF LEFT HAND: ICD-10-CM

## 2024-02-20 PROCEDURE — 36000706: Performed by: ORTHOPAEDIC SURGERY

## 2024-02-20 PROCEDURE — 37000008 HC ANESTHESIA 1ST 15 MINUTES: Performed by: ORTHOPAEDIC SURGERY

## 2024-02-20 PROCEDURE — D9220A PRA ANESTHESIA: Mod: CRNA,,, | Performed by: NURSE ANESTHETIST, CERTIFIED REGISTERED

## 2024-02-20 PROCEDURE — 25000003 PHARM REV CODE 250: Performed by: ANESTHESIOLOGY

## 2024-02-20 PROCEDURE — 63600175 PHARM REV CODE 636 W HCPCS: Performed by: ANESTHESIOLOGY

## 2024-02-20 PROCEDURE — 63600175 PHARM REV CODE 636 W HCPCS: Performed by: NURSE PRACTITIONER

## 2024-02-20 PROCEDURE — 71000015 HC POSTOP RECOV 1ST HR: Performed by: ORTHOPAEDIC SURGERY

## 2024-02-20 PROCEDURE — 63600175 PHARM REV CODE 636 W HCPCS: Performed by: NURSE ANESTHETIST, CERTIFIED REGISTERED

## 2024-02-20 PROCEDURE — 76942 ECHO GUIDE FOR BIOPSY: CPT | Performed by: ANESTHESIOLOGY

## 2024-02-20 PROCEDURE — D9220A PRA ANESTHESIA: Mod: ANES,,, | Performed by: ANESTHESIOLOGY

## 2024-02-20 PROCEDURE — 37000009 HC ANESTHESIA EA ADD 15 MINS: Performed by: ORTHOPAEDIC SURGERY

## 2024-02-20 PROCEDURE — 64721 CARPAL TUNNEL SURGERY: CPT | Mod: LT,,, | Performed by: ORTHOPAEDIC SURGERY

## 2024-02-20 PROCEDURE — 25000003 PHARM REV CODE 250: Performed by: NURSE ANESTHETIST, CERTIFIED REGISTERED

## 2024-02-20 PROCEDURE — 25000003 PHARM REV CODE 250: Performed by: ORTHOPAEDIC SURGERY

## 2024-02-20 PROCEDURE — 26055 INCISE FINGER TENDON SHEATH: CPT | Mod: 51,LT,, | Performed by: ORTHOPAEDIC SURGERY

## 2024-02-20 PROCEDURE — 36000707: Performed by: ORTHOPAEDIC SURGERY

## 2024-02-20 PROCEDURE — 25000003 PHARM REV CODE 250: Performed by: NURSE PRACTITIONER

## 2024-02-20 RX ORDER — KETAMINE HYDROCHLORIDE 50 MG/ML
INJECTION, SOLUTION INTRAMUSCULAR; INTRAVENOUS
Status: DISCONTINUED | OUTPATIENT
Start: 2024-02-20 | End: 2024-02-20

## 2024-02-20 RX ORDER — ONDANSETRON HYDROCHLORIDE 2 MG/ML
4 INJECTION, SOLUTION INTRAVENOUS EVERY 12 HOURS PRN
Status: DISCONTINUED | OUTPATIENT
Start: 2024-02-20 | End: 2024-02-20 | Stop reason: HOSPADM

## 2024-02-20 RX ORDER — KETOROLAC TROMETHAMINE 10 MG/1
10 TABLET, FILM COATED ORAL 3 TIMES DAILY
Qty: 15 TABLET | Refills: 0 | Status: SHIPPED | OUTPATIENT
Start: 2024-02-20 | End: 2024-02-20 | Stop reason: HOSPADM

## 2024-02-20 RX ORDER — SODIUM CHLORIDE, SODIUM GLUCONATE, SODIUM ACETATE, POTASSIUM CHLORIDE AND MAGNESIUM CHLORIDE 30; 37; 368; 526; 502 MG/100ML; MG/100ML; MG/100ML; MG/100ML; MG/100ML
INJECTION, SOLUTION INTRAVENOUS CONTINUOUS
Status: DISCONTINUED | OUTPATIENT
Start: 2024-02-20 | End: 2024-02-20 | Stop reason: HOSPADM

## 2024-02-20 RX ORDER — TRAMADOL HYDROCHLORIDE 50 MG/1
50 TABLET ORAL EVERY 4 HOURS PRN
Status: DISCONTINUED | OUTPATIENT
Start: 2024-02-20 | End: 2024-02-20 | Stop reason: HOSPADM

## 2024-02-20 RX ORDER — ROPIVACAINE HYDROCHLORIDE 5 MG/ML
INJECTION, SOLUTION EPIDURAL; INFILTRATION; PERINEURAL
Status: DISCONTINUED | OUTPATIENT
Start: 2024-02-20 | End: 2024-02-20

## 2024-02-20 RX ORDER — MIDAZOLAM HYDROCHLORIDE 1 MG/ML
2 INJECTION INTRAMUSCULAR; INTRAVENOUS
Status: DISCONTINUED | OUTPATIENT
Start: 2024-02-20 | End: 2024-02-20 | Stop reason: HOSPADM

## 2024-02-20 RX ORDER — MUPIROCIN 20 MG/G
OINTMENT TOPICAL 2 TIMES DAILY
Status: DISCONTINUED | OUTPATIENT
Start: 2024-02-20 | End: 2024-02-20 | Stop reason: HOSPADM

## 2024-02-20 RX ORDER — CLINDAMYCIN PHOSPHATE 900 MG/50ML
INJECTION, SOLUTION INTRAVENOUS
Status: DISCONTINUED | OUTPATIENT
Start: 2024-02-20 | End: 2024-02-20

## 2024-02-20 RX ORDER — HYDROCODONE BITARTRATE AND ACETAMINOPHEN 5; 325 MG/1; MG/1
1 TABLET ORAL EVERY 4 HOURS PRN
Status: DISCONTINUED | OUTPATIENT
Start: 2024-02-20 | End: 2024-02-20 | Stop reason: HOSPADM

## 2024-02-20 RX ORDER — PROPOFOL 10 MG/ML
VIAL (ML) INTRAVENOUS CONTINUOUS PRN
Status: DISCONTINUED | OUTPATIENT
Start: 2024-02-20 | End: 2024-02-20

## 2024-02-20 RX ORDER — ROPIVACAINE HYDROCHLORIDE 5 MG/ML
30 INJECTION, SOLUTION EPIDURAL; INFILTRATION; PERINEURAL ONCE
Status: DISCONTINUED | OUTPATIENT
Start: 2024-02-20 | End: 2024-02-20 | Stop reason: HOSPADM

## 2024-02-20 RX ORDER — HYDROCODONE BITARTRATE AND ACETAMINOPHEN 5; 325 MG/1; MG/1
1 TABLET ORAL EVERY 6 HOURS PRN
Qty: 10 TABLET | Refills: 0 | Status: SHIPPED | OUTPATIENT
Start: 2024-02-20

## 2024-02-20 RX ORDER — LIDOCAINE HYDROCHLORIDE 10 MG/ML
INJECTION INFILTRATION; PERINEURAL
Status: DISCONTINUED | OUTPATIENT
Start: 2024-02-20 | End: 2024-02-20 | Stop reason: HOSPADM

## 2024-02-20 RX ORDER — CELECOXIB 200 MG/1
200 CAPSULE ORAL
Status: DISCONTINUED | OUTPATIENT
Start: 2024-02-20 | End: 2024-02-20 | Stop reason: HOSPADM

## 2024-02-20 RX ORDER — LIDOCAINE HYDROCHLORIDE 10 MG/ML
INJECTION INFILTRATION; PERINEURAL
Status: DISCONTINUED
Start: 2024-02-20 | End: 2024-02-20 | Stop reason: HOSPADM

## 2024-02-20 RX ORDER — ENOXAPARIN SODIUM 150 MG/ML
100 INJECTION SUBCUTANEOUS 2 TIMES DAILY
COMMUNITY

## 2024-02-20 RX ORDER — PHENYLEPHRINE HYDROCHLORIDE 10 MG/ML
INJECTION INTRAVENOUS
Status: DISCONTINUED | OUTPATIENT
Start: 2024-02-20 | End: 2024-02-20

## 2024-02-20 RX ORDER — LIDOCAINE HYDROCHLORIDE 10 MG/ML
INJECTION, SOLUTION EPIDURAL; INFILTRATION; INTRACAUDAL; PERINEURAL
Status: DISCONTINUED | OUTPATIENT
Start: 2024-02-20 | End: 2024-02-20

## 2024-02-20 RX ORDER — ONDANSETRON 4 MG/1
4 TABLET, ORALLY DISINTEGRATING ORAL
Status: COMPLETED | OUTPATIENT
Start: 2024-02-20 | End: 2024-02-20

## 2024-02-20 RX ORDER — SODIUM CHLORIDE 0.9 % (FLUSH) 0.9 %
3 SYRINGE (ML) INJECTION EVERY 6 HOURS PRN
Status: DISCONTINUED | OUTPATIENT
Start: 2024-02-20 | End: 2024-02-20 | Stop reason: HOSPADM

## 2024-02-20 RX ORDER — ACETAMINOPHEN 500 MG
1000 TABLET ORAL
Status: DISCONTINUED | OUTPATIENT
Start: 2024-02-20 | End: 2024-02-20 | Stop reason: HOSPADM

## 2024-02-20 RX ADMIN — ROPIVACAINE HYDROCHLORIDE 30 ML: 5 INJECTION, SOLUTION EPIDURAL; INFILTRATION; PERINEURAL at 07:02

## 2024-02-20 RX ADMIN — PROPOFOL 50 MG: 10 INJECTION, EMULSION INTRAVENOUS at 07:02

## 2024-02-20 RX ADMIN — ACETAMINOPHEN 1000 MG: 500 TABLET ORAL at 06:02

## 2024-02-20 RX ADMIN — KETAMINE HYDROCHLORIDE 25 MG: 50 INJECTION INTRAMUSCULAR; INTRAVENOUS at 07:02

## 2024-02-20 RX ADMIN — CEFAZOLIN 2 G: 2 INJECTION, POWDER, FOR SOLUTION INTRAMUSCULAR; INTRAVENOUS at 08:02

## 2024-02-20 RX ADMIN — PHENYLEPHRINE HYDROCHLORIDE 100 MCG: 10 INJECTION INTRAVENOUS at 08:02

## 2024-02-20 RX ADMIN — LIDOCAINE HYDROCHLORIDE 50 MG: 10 INJECTION, SOLUTION EPIDURAL; INFILTRATION; INTRACAUDAL; PERINEURAL at 07:02

## 2024-02-20 RX ADMIN — SODIUM CHLORIDE, SODIUM GLUCONATE, SODIUM ACETATE, POTASSIUM CHLORIDE AND MAGNESIUM CHLORIDE: 526; 502; 368; 37; 30 INJECTION, SOLUTION INTRAVENOUS at 06:02

## 2024-02-20 RX ADMIN — ONDANSETRON 4 MG: 4 TABLET, ORALLY DISINTEGRATING ORAL at 06:02

## 2024-02-20 RX ADMIN — PROPOFOL 50 MG: 10 INJECTION, EMULSION INTRAVENOUS at 08:02

## 2024-02-20 RX ADMIN — PROPOFOL 75 MCG/KG/MIN: 10 INJECTION, EMULSION INTRAVENOUS at 07:02

## 2024-02-20 RX ADMIN — MIDAZOLAM HYDROCHLORIDE 2 MG: 1 INJECTION, SOLUTION INTRAMUSCULAR; INTRAVENOUS at 07:02

## 2024-02-20 NOTE — ANESTHESIA PROCEDURE NOTES
Peripheral Block    Patient location during procedure: pre-op    Reason for block: primary anesthetic    Diagnosis: Left Carpal tunnel syndrome and left trigger fingers   Start time: 2/20/2024 7:12 AM  Timeout: 2/20/2024 7:10 AM   End time: 2/20/2024 7:13 AM    Staffing  Authorizing Provider: Shreya Rodriguez MD  Performing Provider: Shreya Rodriguez MD    Staffing  Performed by: Shreya Rodriguez MD  Authorized by: Shreya Rodriguez MD    Preanesthetic Checklist  Completed: patient identified, IV checked, site marked, risks and benefits discussed, surgical consent, monitors and equipment checked, pre-op evaluation and timeout performed  Peripheral Block  Patient position: supine  Prep: ChloraPrep  Patient monitoring: heart rate, cardiac monitor, continuous pulse ox, continuous capnometry and frequent blood pressure checks  Block type: supraclavicular  Laterality: left  Injection technique: single shot  Needle  Needle type: Stimuplex   Needle gauge: 22 G  Needle length: 2 in  Needle localization: anatomical landmarks and ultrasound guidance   -ultrasound image captured on disc.  Assessment  Injection assessment: negative aspiration, negative parasthesia and local visualized surrounding nerve  Paresthesia pain: none  Heart rate change: no  Slow fractionated injection: yes  Pain Tolerance: comfortable throughout block and no complaints  Medications:    Medications: ropivacaine (NAROPIN) injection 0.5% - Perineural   30 mL - 2/20/2024 7:12:00 AM

## 2024-02-20 NOTE — OP NOTE
Date of Service: 02/20/2024    Preoperative diagnosis: Left Carpal tunnel syndrome, trigger thumb, ring finger trigger    Postoperative diagnosis: Left Carpal tunnel syndrome, trigger thumb, ring finger trigger    Attending surgeon: Wally Tavares MD    Assistant: Laura Luna, nurse practitioner.  Ms. Luna was essential in manipulating the hand while perform the release, retraction and nerve protection, and closure.    Procedure: Left Carpal tunnel release, thumb trigger release, ring finger trigger release     Anesthesia: General plus monitored care    Estimated blood loss: Minimal    Tourniquet time:  25 Minutes    Complications: None    History of present illness: Alexa is a pleasant 78 y.o.-year-old who has had persisted numbness tingling and shooting pain into the hand.  Subjective and objective signs of carpal tunnel syndrome were observed.  She also had active triggering of her thumb and ring finger.  After failing conservative measures, I recommended open carpal tunnel release.  The risks, benefits, and alternatives to therapy were presented.  The patient elects to proceed    Procedure: Alexa was initially seen in the preoperative unit where a history and physical were reviewed without change.  The operative extremity was marked.  Consents were reviewed.  All questions were answered.  The patient was then taken to the operating room placed supine on the operative table where regional + monitored care was undertaken.  I injected local anesthesia around the incision.  Perioperative antibiotics were administered.  The operative extremity was prepped and draped in sterile fashion.  An attending lead timeout confirmed the operative side; I then began the procedure.    I began my incision at the proximal thumb crease over the oblique pulley.  I sharply incised through skin and spread through subcutaneous tissue.  I identified the superficial nerve and protected it through the procedure.  I then identified the  oblique pulley and incised in line with the tendon.  The tendon was inspected and intact.  They were no masses or lesions.  I irrigated out the incision.  I closed the incision with chromic sutures.     I then made a separate incision over the ring finger A1 pulley.  Sharply incised through skin and subcutaneous tissue.  Identified the A1 pulley and split in line with the tendon.  I inspected the tendon it was intact.  The incision was irrigated and closed with chromic suture.    I then made my incision at the intersection of Iniguez's line and the radial side of the ring finger.  I sharply incised through skin and subcutaneous tissue.  I split through the palmar fascia.  I then came down to the transverse carpal ligament.  I first incised this with a 15 blade.  I cleaned the underside of the ligament using a freer elevator.  I then completed the incision using scissors while visualizing the nerve.  I incised it distally until I encountered yellow fat.  I incised proximally until I was in the forearm fascia.  I inspected the nerve and there was no lesions.  There was no abnormal contents of the carpal tunnel.  I irrigated out the incision.  I closed the incision with chrmoic sutures.  The patient was placed into a resting hand splint.  The patient was awoken from anesthesia and transferred to the postop unit good condition.

## 2024-02-20 NOTE — ANESTHESIA POSTPROCEDURE EVALUATION
Anesthesia Post Evaluation    Patient: Alexa Rodriguez    Procedure(s) Performed: Procedure(s) (LRB):  RELEASE, CARPAL TUNNEL (Left)  RELEASE, TRIGGER FINGER - thumb and ring (Left)    Final Anesthesia Type: regional      Patient location during evaluation: [facility]  Patient participation: Yes- Able to Participate  Level of consciousness: awake and alert  Post-procedure vital signs: reviewed and stable  Pain management: adequate  Airway patency: patent    PONV status at discharge: No PONV  Anesthetic complications: no      Cardiovascular status: blood pressure returned to baseline and hemodynamically stable  Respiratory status: unassisted and spontaneous ventilation  Hydration status: euvolemic  Follow-up not needed.              Vitals Value Taken Time   /61 02/20/24 0916   Temp 35.6 °C (96 °F) 02/20/24 0915   Pulse 60 02/20/24 0922   Resp 18 02/20/24 0915   SpO2 91 % 02/20/24 0922   Vitals shown include unvalidated device data.      No case tracking events are documented in the log.      Pain/Macy Score: Pain Rating Prior to Med Admin: 0 (2/20/2024  6:44 AM)  Macy Score: 10 (2/20/2024  9:17 AM)  Modified Macy Score: 20 (2/20/2024  9:17 AM)

## 2024-02-20 NOTE — DISCHARGE SUMMARY
[facility] Periop Services  Discharge Note  Short Stay    Procedure(s) (LRB):  RELEASE, CARPAL TUNNEL (Left)  RELEASE, TRIGGER FINGER - thumb and ring (Left)      OUTCOME: Patient tolerated treatment/procedure well without complication and is now ready for discharge.    DISPOSITION: Home or Self Care    FINAL DIAGNOSIS:  <principal problem not specified>    FOLLOWUP: In clinic    DISCHARGE INSTRUCTIONS:  No discharge procedures on file.     TIME SPENT ON DISCHARGE: 10 minutes

## 2024-02-20 NOTE — TRANSFER OF CARE
"Anesthesia Transfer of Care Note    Patient: Alexa Rodriguez    Procedure(s) Performed: Procedure(s) (LRB):  RELEASE, CARPAL TUNNEL (Left)  RELEASE, TRIGGER FINGER - thumb and ring (Left)    Patient location: Other: phase 2 outpatient    Anesthesia Type: regional    Transport from OR: Transported from OR on room air with adequate spontaneous ventilation    Post pain: adequate analgesia    Post vital signs: stable    Level of consciousness: sedated and awake    Nausea/Vomiting: no nausea/vomiting    Complications: none    Transfer of care protocol was followed      Last vitals: Visit Vitals  BP (!) 110/50   Pulse (!) 57   Temp 36 °C (96.8 °F)   Resp 14   Ht 5' 3" (1.6 m)   Wt 104.7 kg (230 lb 13.2 oz)   SpO2 97%   Breastfeeding No   BMI 40.89 kg/m²     "

## 2024-02-20 NOTE — DISCHARGE INSTRUCTIONS
[facility]  Wally Tavares Jr., MD  [address], [address]  [place] [address]  [phone], fax: [phone]    CARPAL TUNNEL RELEASE    DIET:  Following general anesthesia, start with clear liquids to decrease chances of nausea.  Begin with water, coffee, tea, ginger ale, sprite, or apple juice.  If tolerated, advance to Jell-o, soup, crackers, or toast.  Once these are tolerated, advance to a regular diet.    DRESSING:  Keep the splint clean and dry.  Do not remove the splint until the first follow up appointment.    SHOWERING:  You may shower after surgery.  Keep the splint clean and dry during showers.    ACTIVITY:            -  Elevate the surgical site as upright as possible using extra pillows as needed.  Do this for the first few days after surgery to help decrease pain and swelling.            -  Ice should be applied to the surgical site for 30 minutes, 5-6 times per day, for the 1st week to help decrease pain and swelling.      PAIN MEDICATION:       -  Use the Norco as prescribed for pain after surgery.  Pain medicine can cause nausea and vomiting, especially on an empty stomach.       -  Ice and elevation are more useful than pain medicine for surgical pain.  If you are having too much pain or discomfort, try more ice and higher elevation.       -  Do NOT drink alcohol while taking pain medication.    BLOOD CLOT PREVENTION:  If you are aware that you are at high risk for blood blots, notify your physician.  In general, you should walk as much as possible after surgery to increase blood circulation throughout your body.     DRIVING OR FLYING:  In general, you should NOT drive while wearing a sling  You must NEVER drive while taking narcotic pain medication  You may ride in a car, take a train, or fly once you feel comfortable    PHYSICAL THERAPY:  You will not start physical therapy until after your first follow up appointment.    WORK OR SCHOOL:  You may  return to an office job or school whenver comfortable.  Most patients return ~ 1 week after surgery.  For more active jobs that require extended walking, squatting, or lifting, you can wait until after your follow up appointment.  Any other types of jobs should be discussed with Dr. Tavares to determine a date for return to work.    PROBLEMS TO REPORT:       1.  Fever greater than 102 F       2.  Incision that is very red and/or draining pus       3.  Unable to urinate within 8 hours of surgery (a rare effect of being put to sleep for surgery)  Calls should be directed to the clinic:  [phone]    RETURN APPOINTMENT:  To confirm or reschedule your appointment, call [phone]

## 2024-02-21 VITALS
HEIGHT: 63 IN | TEMPERATURE: 96 F | RESPIRATION RATE: 18 BRPM | SYSTOLIC BLOOD PRESSURE: 128 MMHG | DIASTOLIC BLOOD PRESSURE: 61 MMHG | WEIGHT: 230.81 LBS | HEART RATE: 59 BPM | BODY MASS INDEX: 40.89 KG/M2 | OXYGEN SATURATION: 95 %

## 2024-03-01 ENCOUNTER — OFFICE VISIT (OUTPATIENT)
Dept: ORTHOPEDICS | Facility: CLINIC | Age: 79
End: 2024-03-01
Payer: MEDICARE

## 2024-03-01 VITALS
DIASTOLIC BLOOD PRESSURE: 70 MMHG | HEART RATE: 62 BPM | SYSTOLIC BLOOD PRESSURE: 136 MMHG | HEIGHT: 63 IN | WEIGHT: 230 LBS | BODY MASS INDEX: 40.75 KG/M2

## 2024-03-01 DIAGNOSIS — Z98.890 S/P TRIGGER FINGER RELEASE: ICD-10-CM

## 2024-03-01 DIAGNOSIS — Z98.890 S/P CARPAL TUNNEL RELEASE: Primary | ICD-10-CM

## 2024-03-01 PROCEDURE — 99024 POSTOP FOLLOW-UP VISIT: CPT | Mod: POP,,, | Performed by: NURSE PRACTITIONER

## 2024-03-01 NOTE — PROGRESS NOTES
Chief Complaint:   Chief Complaint   Patient presents with    Left Wrist - Post-op Evaluation    Left Hand - Post-op Evaluation    Post-op Evaluation     10 day sp left carpal tunnel release and left trigger finger release sx 2/20/24 GL 5/20/24, patient presents in splint and has no complaints. States swelling went down after elevating it.        History of present illness:  02/20/2024:  Left carpal tunnel release, left thumb and ring finger trigger release    She returns today.  Her pain is under good control.  She has been compliant in his splint.  Her preoperative symptoms are resolving    Musculoskeletal:   Incisions healing, sutures intact. Without erythema, drainage, or signs of infection. Distally neurovascular intact.  No triggering of her fingers           Assessment: S/P carpal tunnel release    S/P trigger finger release        Plan:  Doing well status post above.  Sutures out today, Steri-Strips applied.  She can gradually resume her activities as tolerated.  She can follow up if she has any issues

## 2024-05-23 ENCOUNTER — TELEPHONE (OUTPATIENT)
Dept: NEUROSURGERY | Facility: CLINIC | Age: 79
End: 2024-05-23
Payer: MEDICARE

## 2024-05-23 ENCOUNTER — TELEPHONE (OUTPATIENT)
Dept: ORTHOPEDICS | Facility: CLINIC | Age: 79
End: 2024-05-23
Payer: MEDICARE

## 2024-05-23 NOTE — TELEPHONE ENCOUNTER
Patient would like you to call her. Dr Chi wants her to ask you a question ?   Please call ph. [phone]

## 2024-05-23 NOTE — TELEPHONE ENCOUNTER
Spoke with patient regarding her current symptoms. She is describing a dulled and decreased sensation in the bilateral hands.  I encouraged her to reach out to Dr. Tavares as she was recently undergone left carpal tunnel release as well as trigger finger releases with this provider.  Should he feel her symptoms are cervicogenic we will see the patient back in clinic to re-evaluate her foraminal stenosis.  This was discussed with the patient and she is in agreement.  Thanks

## 2024-06-11 ENCOUNTER — TELEPHONE (OUTPATIENT)
Dept: NEUROSURGERY | Facility: CLINIC | Age: 79
End: 2024-06-11
Payer: MEDICARE

## 2024-06-11 DIAGNOSIS — M48.02 SPINAL STENOSIS, CERVICAL REGION: Primary | ICD-10-CM

## 2024-06-11 DIAGNOSIS — M47.22 CERVICAL SPONDYLOSIS WITH RADICULOPATHY: ICD-10-CM

## 2024-06-11 NOTE — TELEPHONE ENCOUNTER
----- Message from Nuvia Yangdipesh sent at 6/10/2024  2:36 PM CDT -----  This is an add on for your previous message with patient      She states she spoke to Dr. Chaitanya ROMAN and the pain is not coming from her recent carpal tunnel surgery.  The increase of Gabapenin has helped but she recently went on a trip and is now having balance issues and can't walk for long.  She also mentioned she is short of breathe.  Can we order an MRI for her.  I did inform her you were not in the office today.  She said no rush.  Please advise. [phone]

## 2024-06-11 NOTE — TELEPHONE ENCOUNTER
I returned the pts call and scheduled her MRI/XR @ [facility] on 6/24/23. She will f/u w/ Cee for the results on 6/26/24. She states she would like to become established w/ Dr. Lees going forward. Pt was agreeable w/ this appt date and time.

## 2024-06-11 NOTE — TELEPHONE ENCOUNTER
I had recommend the patient returned to clinic with updated MRI of the cervical spine without contrast as well as flexion/extension/AP and lateral views of the cervical spine. I have entered the orders.  Also please advise the patient that Dr. Gorman will be retiring therefore she will need to become established with the there Dr. Lees or Dr. Byrne.  Please have her return sooner rather than later on either my or Leonie schedule.  Thanks

## 2024-06-20 ENCOUNTER — TELEPHONE (OUTPATIENT)
Dept: NEUROSURGERY | Facility: CLINIC | Age: 79
End: 2024-06-20
Payer: MEDICARE

## 2024-06-20 RX ORDER — DIAZEPAM 10 MG/1
10 TABLET ORAL ONCE AS NEEDED
Qty: 2 TABLET | Refills: 0 | Status: SHIPPED | OUTPATIENT
Start: 2024-06-20

## 2024-06-20 NOTE — TELEPHONE ENCOUNTER
Patient called requesting for Valium to be sent to [facility] in [place] at the corner of [address], I changed it in her chart. She is scheduled for her MRI on Monday.
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
## 2024-06-24 ENCOUNTER — HOSPITAL ENCOUNTER (OUTPATIENT)
Dept: RADIOLOGY | Facility: HOSPITAL | Age: 79
Discharge: HOME | End: 2024-06-24
Attending: NURSE PRACTITIONER
Payer: MEDICARE

## 2024-06-24 DIAGNOSIS — M47.22 CERVICAL SPONDYLOSIS WITH RADICULOPATHY: ICD-10-CM

## 2024-06-24 PROCEDURE — 72052 X-RAY EXAM NECK SPINE 6/>VWS: CPT | Mod: TC

## 2024-06-26 ENCOUNTER — OFFICE VISIT (OUTPATIENT)
Dept: NEUROSURGERY | Facility: CLINIC | Age: 79
End: 2024-06-26
Payer: MEDICARE

## 2024-06-26 VITALS
HEIGHT: 63 IN | RESPIRATION RATE: 16 BRPM | BODY MASS INDEX: 41.93 KG/M2 | WEIGHT: 236.63 LBS | SYSTOLIC BLOOD PRESSURE: 110 MMHG | DIASTOLIC BLOOD PRESSURE: 55 MMHG | HEART RATE: 59 BPM

## 2024-06-26 DIAGNOSIS — G37.9 DEMYELINATING DISEASE: ICD-10-CM

## 2024-06-26 DIAGNOSIS — M48.02 SPINAL STENOSIS, CERVICAL REGION: ICD-10-CM

## 2024-06-26 DIAGNOSIS — M47.22 CERVICAL SPONDYLOSIS WITH RADICULOPATHY: Primary | ICD-10-CM

## 2024-06-26 PROCEDURE — 99215 OFFICE O/P EST HI 40 MIN: CPT | Mod: ,,, | Performed by: NURSE PRACTITIONER

## 2024-06-26 RX ORDER — CITALOPRAM 20 MG/1
TABLET, FILM COATED ORAL
COMMUNITY
End: 2024-06-26

## 2024-06-26 RX ORDER — VIT C/E/ZN/COPPR/LUTEIN/ZEAXAN 250MG-90MG
1 CAPSULE ORAL EVERY MORNING
COMMUNITY

## 2024-06-26 RX ORDER — ALPRAZOLAM 1 MG/1
TABLET ORAL
COMMUNITY
End: 2024-06-26

## 2024-06-26 RX ORDER — CETIRIZINE HYDROCHLORIDE 10 MG/1
1 TABLET ORAL EVERY MORNING
COMMUNITY

## 2024-06-26 RX ORDER — ALIROCUMAB 75 MG/ML
INJECTION, SOLUTION SUBCUTANEOUS
COMMUNITY
Start: 2024-03-12

## 2024-06-26 RX ORDER — KETOROLAC TROMETHAMINE 10 MG/1
TABLET, FILM COATED ORAL
COMMUNITY
Start: 2024-02-20 | End: 2024-06-26

## 2024-06-26 RX ORDER — IBUPROFEN 200 MG
1 CAPSULE ORAL DAILY
COMMUNITY

## 2024-06-26 RX ORDER — FLUOROURACIL 50 MG/G
1 CREAM TOPICAL 2 TIMES DAILY
COMMUNITY
Start: 2024-06-26

## 2024-06-26 RX ORDER — ENOXAPARIN SODIUM 100 MG/ML
INJECTION SUBCUTANEOUS
COMMUNITY
Start: 2024-01-23 | End: 2024-06-26

## 2024-06-26 RX ORDER — EZETIMIBE 10 MG/1
TABLET ORAL
COMMUNITY
End: 2024-06-26

## 2024-06-26 RX ORDER — IPRATROPIUM BROMIDE AND ALBUTEROL 20; 100 UG/1; UG/1
SPRAY, METERED RESPIRATORY (INHALATION)
COMMUNITY

## 2024-06-26 RX ORDER — PAROXETINE 10 MG/1
10 TABLET, FILM COATED ORAL
COMMUNITY

## 2024-06-26 RX ORDER — SULFAMETHOXAZOLE AND TRIMETHOPRIM 800; 160 MG/1; MG/1
TABLET ORAL
COMMUNITY
Start: 2023-05-09 | End: 2024-06-26

## 2024-06-26 RX ORDER — VIBEGRON 75 MG/1
TABLET, FILM COATED ORAL
COMMUNITY
Start: 2024-03-12

## 2024-08-21 ENCOUNTER — HOSPITAL ENCOUNTER (OUTPATIENT)
Dept: RADIOLOGY | Facility: HOSPITAL | Age: 79
Discharge: HOME | End: 2024-08-21
Attending: NURSE PRACTITIONER
Payer: MEDICARE

## 2024-08-21 DIAGNOSIS — G37.9 DEMYELINATING DISEASE: ICD-10-CM

## 2024-08-21 DIAGNOSIS — M48.02 SPINAL STENOSIS, CERVICAL REGION: ICD-10-CM

## 2024-08-21 DIAGNOSIS — M47.22 CERVICAL SPONDYLOSIS WITH RADICULOPATHY: ICD-10-CM

## 2024-08-21 PROCEDURE — 70551 MRI BRAIN STEM W/O DYE: CPT | Mod: TC

## 2024-08-21 PROCEDURE — 72125 CT NECK SPINE W/O DYE: CPT | Mod: TC

## 2024-08-28 ENCOUNTER — OFFICE VISIT (OUTPATIENT)
Dept: NEUROSURGERY | Facility: CLINIC | Age: 79
End: 2024-08-28
Payer: MEDICARE

## 2024-08-28 VITALS
HEIGHT: 63 IN | SYSTOLIC BLOOD PRESSURE: 125 MMHG | BODY MASS INDEX: 42.35 KG/M2 | RESPIRATION RATE: 16 BRPM | DIASTOLIC BLOOD PRESSURE: 71 MMHG | HEART RATE: 76 BPM | WEIGHT: 239 LBS

## 2024-08-28 DIAGNOSIS — M47.22 CERVICAL SPONDYLOSIS WITH RADICULOPATHY: Primary | ICD-10-CM

## 2024-08-28 PROCEDURE — 99214 OFFICE O/P EST MOD 30 MIN: CPT | Mod: ,,, | Performed by: STUDENT IN AN ORGANIZED HEALTH CARE EDUCATION/TRAINING PROGRAM

## 2024-08-28 RX ORDER — CETIRIZINE HYDROCHLORIDE 10 MG/1
1 TABLET ORAL NIGHTLY
COMMUNITY

## 2024-08-28 RX ORDER — CLOPIDOGREL BISULFATE 75 MG/1
75 TABLET ORAL
COMMUNITY
Start: 2024-08-07 | End: 2025-02-03

## 2024-08-28 RX ORDER — CYCLOBENZAPRINE HCL 5 MG
5 TABLET ORAL NIGHTLY PRN
Qty: 90 TABLET | Refills: 2 | Status: SHIPPED | OUTPATIENT
Start: 2024-08-28 | End: 2025-05-25

## 2024-08-28 RX ORDER — ISOSORBIDE MONONITRATE 30 MG/1
30 TABLET, EXTENDED RELEASE ORAL
COMMUNITY
Start: 2024-07-24

## 2024-08-28 RX ORDER — ASPIRIN 81 MG/1
1 TABLET ORAL EVERY MORNING
COMMUNITY
Start: 2024-08-07 | End: 2025-08-07

## 2024-09-09 NOTE — PROGRESS NOTES
[facility]  Follow-up visit  Neurosurgery    Alexa Rodriguez   [id number]   1945     SUBJECTIVE:     CHIEF COMPLAINT:  Scheduled follow-up    HPI:  Alexa Rodriguez is a 78 y.o. female who presents for follow up appointment.   Last seen in the office on 6/26/24. Complains of neck pain radiating down both arms and occasional hand numbness, worse on the right. Occasional muscle spasms.  Recent cardiac stens. She is on Eliquis, ASA and Plavix.  Told she can't have any type of surgery in the next 6 mo  Denies bladder/bowel issues or difficulties with balance.  CT C spine ordered during last visit. She is heere today to review results.    Past Medical History:   Diagnosis Date    Angina pectoris     Arthritis     Atrial fibrillation     History of skin cancer     HTN (hypertension)     Hyperlipidemia     Hyperparathyroidism, unspecified     IBS (irritable bowel syndrome)     ALAINA on CPAP     Spinal stenosis, cervical region     Stomach ulcer     TIA (transient ischemic attack)        Past Surgical History:   Procedure Laterality Date    CARPAL TUNNEL RELEASE Left 2/20/2024    Procedure: RELEASE, CARPAL TUNNEL;  Surgeon: Wally Tavares Jr., MD;  Location: [facility];  Service: Orthopedics;  Laterality: Left;    CATARACT EXTRACTION W/ INTRAOCULAR LENS IMPLANT Bilateral     Cataracts    CLOSURE OF DEFECT OF MOHS PROCEDURE      CRYOABLATION      for afib    HYSTERECTOMY      KNEE ARTHROSCOPY Right     ORIF LEFT ANKLE      TONSILLECTOMY      TRIGGER FINGER RELEASE Left 2/20/2024    Procedure: RELEASE, TRIGGER FINGER - thumb and ring;  Surgeon: Wally Tavares Jr., MD;  Location: [facility] OR;  Service: Orthopedics;  Laterality: Left;       Family History   Problem Relation Name Age of Onset    Lung cancer Mother Julisa     Arthritis Mother Julisa     COPD Mother Julisa     Hearing loss Mother Julisa     Colon cancer Father Aric     Depression Father Aric     Heart disease Father Aric     Skin cancer Sister Brynn      Hyperlipidemia Sister Brynn     Cancer Sister Genie        Social History     Socioeconomic History    Marital status:    Tobacco Use    Smoking status: Never    Smokeless tobacco: Never    Tobacco comments:     None   Substance and Sexual Activity    Alcohol use: Yes     Alcohol/week: 1.0 standard drink of alcohol     Types: 1 Glasses of wine per week     Comment: Varies with occaaions    Drug use: Never    Sexual activity: Never       Review of patient's allergies indicates:   Allergen Reactions    Morphine Itching    Codeine Nausea And Vomiting and Other (See Comments)    Meperidine Nausea And Vomiting and Other (See Comments)     Other reaction(s): vomiting        Current Outpatient Medications   Medication Instructions    apixaban (ELIQUIS) 5 mg, Oral, 2 times daily    aspirin (ECOTRIN) 81 MG EC tablet 1 tablet, Oral, Every morning    atorvastatin (LIPITOR) 80 mg, Oral, Nightly    calcium citrate (CALCITRATE) 200 mg (950 mg) tablet 1 tablet, Oral, Daily    cetirizine (ZYRTEC) 10 MG tablet 1 tablet, Oral, Nightly    cholecalciferol, vitamin D3, (VITAMIN D3) 25 mcg (1,000 unit) capsule 1 capsule, Oral, Every morning    clopidogreL (PLAVIX) 75 mg, Oral    cyclobenzaprine (FLEXERIL) 5 mg, Oral, Nightly PRN    dicyclomine (BENTYL) 10 mg, Oral, Every 6 hours PRN    gabapentin (NEURONTIN) 300 mg, Oral, Nightly    GEMTESA 75 mg Tab     isosorbide mononitrate (IMDUR) 30 mg, Oral    L.acid,gas,plan,rhm/B.ani/cran (UP4 PROBIOTICS WOMEN'S ORAL) 1 tablet, Oral, Daily    losartan (COZAAR) 100 MG tablet 1 tablet, Oral, Daily    metoprolol succinate (TOPROL-XL) 25 MG 24 hr tablet 1 tablet, Oral, Daily    MULTAQ 400 mg, Oral, 2 times daily with meals    nitroGLYCERIN (NITROSTAT) 0.4 MG SL tablet No dose, route, or frequency recorded.    omeprazole (PRILOSEC) 20 MG capsule 1 capsule, Oral, 2 times daily    paroxetine (PAXIL) 10 mg, Oral    PRALUENT PEN 75 mg/mL PnIj       Review of Systems   12 point review of systems  "conducted, negative except as stated in the history of present illness. See HPI for details.    OBJECTIVE:     Visit Vitals  /71   Pulse 76   Resp 16   Ht 5' 3" (1.6 m)   Wt 108.4 kg (239 lb)   BMI 42.34 kg/m²     General:  Pleasant, Well-nourished, Well-groomed.    Lungs:  Breathing is quiet with non-labored respirations.    Neurological:    Alert and oriented to person, place, time, and situation.  Muscle strength against resistance:  Strength  Deltoids Triceps Biceps Wrist Extension Wrist Flexion Intrinsics   Upper: R 5/5 5/5 5/5 5/5  5/5    L 5/5 5/5 5/5 5/5  5/5     Iliopsoas Quadriceps Knee  Flexion Tibialis  anterior Gastro- cnemius EHL   Lower: R 5/5 5/5 5/5 5/5 5/5 5/5    L 5/5 5/5 5/5 5/5 5/5 5/5   Sensation is intact to primary modalities in bilateral upper extremities, decreased distally in hands BL.  Positive tinel's BL  No Tovar's    Imaging:  All pertinent neuroimaging independently reviewed. Discussed these findings in detail with the patient.    CT C spine demonstrates slight reversal of normal cervical lordosis and multilevel degenerative changes.  Foraminal stenosis at multiple levels. Severe on the left at C3-4. BL at C4-5, C5-6 and C6-7.    DIAGNOSES:       ICD-10-CM ICD-9-CM   1. Cervical spondylosis with radiculopathy  M47.22 721.0     ASSESSMENT/PLAN:     We discussed her symptoms and imaging findings. Cannot have any surgical intervention at this point as she is on three different blood thinning medications. Will continue conservative management and see her back in about 6 months to see how she is doing.    Other orders  -     cyclobenzaprine (FLEXERIL) 5 MG tablet; Take 1 tablet (5 mg total) by mouth nightly as needed for Muscle spasms.  Dispense: 90 tablet; Refill: 2    Follow up in about 6 months (around 2/28/2025) for with MD, Follow-up.    Rashaun Lees MD  Neurosurgery  [facility]     30 minutes were personally spent on this visit including my review of available " records, prior imaging, comprehensive physical and neurologic examination and discussion with the patient.     Disclaimer:  This note is prepared using voice recognition software and as such is likely to have errors despite attempts at proofreading.

## 2025-02-26 ENCOUNTER — OFFICE VISIT (OUTPATIENT)
Dept: NEUROSURGERY | Facility: CLINIC | Age: 80
End: 2025-02-26
Payer: MEDICARE

## 2025-02-26 VITALS
WEIGHT: 234 LBS | HEART RATE: 60 BPM | SYSTOLIC BLOOD PRESSURE: 115 MMHG | HEIGHT: 63 IN | DIASTOLIC BLOOD PRESSURE: 81 MMHG | BODY MASS INDEX: 41.46 KG/M2 | RESPIRATION RATE: 16 BRPM

## 2025-02-26 DIAGNOSIS — M47.22 CERVICAL SPONDYLOSIS WITH RADICULOPATHY: Primary | ICD-10-CM

## 2025-02-26 PROCEDURE — 99214 OFFICE O/P EST MOD 30 MIN: CPT | Mod: ,,, | Performed by: STUDENT IN AN ORGANIZED HEALTH CARE EDUCATION/TRAINING PROGRAM

## 2025-02-26 RX ORDER — NYSTATIN 100000 U/G
CREAM TOPICAL DAILY PRN
COMMUNITY
End: 2025-02-26

## 2025-02-26 RX ORDER — HYDROCHLOROTHIAZIDE 12.5 MG/1
12.5 CAPSULE ORAL
COMMUNITY

## 2025-02-26 RX ORDER — CEFDINIR 300 MG/1
300 CAPSULE ORAL 2 TIMES DAILY
COMMUNITY
Start: 2024-10-20 | End: 2025-02-26

## 2025-02-26 RX ORDER — EVOLOCUMAB 140 MG/ML
INJECTION, SOLUTION SUBCUTANEOUS
COMMUNITY
Start: 2025-02-08

## 2025-02-26 RX ORDER — IPRATROPIUM BROMIDE AND ALBUTEROL 20; 100 UG/1; UG/1
SPRAY, METERED RESPIRATORY (INHALATION) DAILY PRN
COMMUNITY

## 2025-02-26 RX ORDER — HYOSCYAMINE SULFATE 0.12 MG/1
0.12 TABLET SUBLINGUAL EVERY 4 HOURS PRN
COMMUNITY
Start: 2024-10-21

## 2025-02-26 RX ORDER — AMOXICILLIN 500 MG/1
500 CAPSULE ORAL 4 TIMES DAILY
COMMUNITY
Start: 2025-02-21

## 2025-02-26 RX ORDER — FERROUS SULFATE 325(65) MG
TABLET ORAL
COMMUNITY
Start: 2024-10-15 | End: 2025-02-26

## 2025-02-26 RX ORDER — FLUTICASONE PROPIONATE 50 MCG
SPRAY, SUSPENSION (ML) NASAL
COMMUNITY
Start: 2025-02-24

## 2025-02-26 RX ORDER — EMPAGLIFLOZIN 25 MG/1
25 TABLET, FILM COATED ORAL
COMMUNITY
Start: 2025-02-12

## 2025-02-26 RX ORDER — HYDROCODONE BITARTRATE AND ACETAMINOPHEN 10; 325 MG/1; MG/1
1 TABLET ORAL EVERY 8 HOURS PRN
COMMUNITY
Start: 2024-11-22 | End: 2025-02-26

## 2025-02-26 RX ORDER — ALPRAZOLAM 0.5 MG/1
0.5 TABLET ORAL
COMMUNITY
Start: 2024-12-03 | End: 2025-02-26

## 2025-02-26 NOTE — PROGRESS NOTES
[facility]  Follow visit  Neurosurgery      Alexa Rodriguez   [id number]   1945       SUBJECTIVE:     CHIEF COMPLAINT:  Neck and bilateral arm pain    HPI:  Alexa Rodriguez is a 79 y.o. female who presents for follow up appointment.   She was last seen in the office on August 28, 2024.  Per prior HPI:Complains of neck pain radiating down both arms and occasional hand numbness, worse on the right. Occasional muscle spasms.   No significant changes in symptoms.  No difficulties with fine motor skills or balance.  Takes Flexeril nightly as needed for spasms as well as gabapentin twice a day.  This medications provide symptom relief.  Continues to take Eliquis Plavix.      Past Medical History:   Diagnosis Date    Angina pectoris     Arthritis     Atrial fibrillation     Cervical spondylosis with radiculopathy     Demyelinating disease     History of skin cancer     HTN (hypertension)     Hyperlipidemia     Hyperparathyroidism, unspecified     IBS (irritable bowel syndrome)     ALAINA on CPAP     S/P carpal tunnel release     S/P trigger finger release     Spinal stenosis, cervical region     Stomach ulcer     TIA (transient ischemic attack)      Past Surgical History:   Procedure Laterality Date    CARPAL TUNNEL RELEASE Left 2/20/2024    Procedure: RELEASE, CARPAL TUNNEL;  Surgeon: Wally Tavares Jr., MD;  Location: [facility] OR;  Service: Orthopedics;  Laterality: Left;    CATARACT EXTRACTION W/ INTRAOCULAR LENS IMPLANT Bilateral     Cataracts    CLOSURE OF DEFECT OF MOHS PROCEDURE      CRYOABLATION      for afib    HYSTERECTOMY      KNEE ARTHROSCOPY Right     ORIF LEFT ANKLE      TONSILLECTOMY      TRIGGER FINGER RELEASE Left 2/20/2024    Procedure: RELEASE, TRIGGER FINGER - thumb and ring;  Surgeon: Wally Tavares Jr., MD;  Location: [facility] OR;  Service: Orthopedics;  Laterality: Left;     Family History   Problem Relation Name Age of Onset    Lung cancer Mother Julisa     Arthritis Mother Julisa     COPD  Mother Julisa     Hearing loss Mother Julisa     Colon cancer Father Aric     Depression Father Aric     Heart disease Father Aric     Skin cancer Sister Brynn     Hyperlipidemia Sister Brynn     Cancer Sister Genie      Social History     Socioeconomic History    Marital status:    Tobacco Use    Smoking status: Never    Smokeless tobacco: Never    Tobacco comments:     None   Substance and Sexual Activity    Alcohol use: Yes     Alcohol/week: 1.0 standard drink of alcohol     Types: 1 Glasses of wine per week     Comment: Varies with occaaions    Drug use: Never    Sexual activity: Never     Review of patient's allergies indicates:   Allergen Reactions    Morphine Itching    Codeine Nausea And Vomiting and Other (See Comments)    Meperidine Nausea And Vomiting and Other (See Comments)     Other reaction(s): vomiting      Current Outpatient Medications   Medication Instructions    amoxicillin (AMOXIL) 500 mg, 4 times daily    apixaban (ELIQUIS) 5 mg, 2 times daily    atorvastatin (LIPITOR) 80 mg, Nightly    calcium citrate (CALCITRATE) 200 mg (950 mg) tablet 1 tablet, Daily    cetirizine (ZYRTEC) 10 MG tablet 1 tablet, Nightly    cholecalciferol, vitamin D3, (VITAMIN D3) 25 mcg (1,000 unit) capsule 1 capsule, Every morning    clopidogreL (PLAVIX) 75 mg, Oral    cyclobenzaprine (FLEXERIL) 5 mg, Oral, Nightly PRN    dicyclomine (BENTYL) 10 mg, Every 6 hours PRN    fluticasone propionate (FLONASE) 50 mcg/actuation nasal spray by Each Nostril route.    gabapentin (NEURONTIN) 300 mg, Nightly    hydroCHLOROthiazide (MICROZIDE) 12.5 mg    hyoscyamine 0.125 mg, Every 4 hours PRN    ipratropium-albuteroL (COMBIVENT RESPIMAT)  mcg/actuation inhaler Daily PRN    JARDIANCE 25 mg    losartan (COZAAR) 100 MG tablet 1 tablet, Daily    metoprolol succinate (TOPROL-XL) 25 MG 24 hr tablet 1 tablet, Daily    MULTAQ 400 mg, 2 times daily with meals    nitroGLYCERIN (NITROSTAT) 0.4 MG SL tablet No dose, route, or  "frequency recorded.    omeprazole (PRILOSEC) 20 MG capsule 1 capsule, 2 times daily    paroxetine (PAXIL) 10 mg    REPATHA SURECLICK 140 mg/mL PnIj SMARTSI Milligram(s) SUB-Q Every 2 Weeks      Review of Systems   12 point review of systems conducted, negative except as stated in the history of present illness. See HPI for details.    OBJECTIVE:     Visit Vitals  /81 (BP Location: Right arm, Patient Position: Sitting)   Pulse 60   Resp 16   Ht 5' 3" (1.6 m)   Wt 106.1 kg (234 lb)   BMI 41.45 kg/m²      General:  Pleasant, Well-nourished, Well-groomed.    Lungs:  Breathing is quiet with non-labored respirations.    Musculoskeletal:  Mild tenderness to palpation paraspinal region cervical spine    Neurological:    Alert and oriented to person, place, time, and situation.  Muscle strength against resistance:  Strength  Deltoids Triceps Biceps Wrist Extension Wrist Flexion Intrinsics   Upper: R 5/5 5/5 5/5 5/5  5/5    L 5/5 5/5 5/5 5/5  5/5     Iliopsoas Quadriceps Knee  Flexion Tibialis  anterior Gastro- cnemius EHL   Lower: R 5/5 5/5 5/5 5/5 5/5 5/5    L 5/5 5/5 5/5 5/5 5/5 5/5   Sensation is intact to primary modalities in bilateral upper extremities.  Tovar's sign is negative bilaterally.  Gait is normal.     Imaging:  All pertinent neuroimaging independently reviewed. Discussed these findings in detail with the patient.      DIAGNOSES:       ICD-10-CM ICD-9-CM   1. Cervical spondylosis with radiculopathy  M47.22 721.0     ASSESSMENT/PLAN:     79-year-old female with history of cervical spondylosis with radiculopathy, no myelopathy on exam.  Symptoms currently we will controlled with medical management.  She will continue taking Flexeril as needed as well as gabapentin twice a day.  She is currently cardiac rehab and plans to return to physical therapy of through completing that program.  No issues with fine motor skills or balance.  We will see her back in about a year with follow-up surveillance MRI " C-spine.    1. Cervical spondylosis with radiculopathy  -     MRI Cervical Spine Without Contrast; Future; Expected date: 02/26/2025      Follow up in about 1 year (around 2/26/2026) for with MD, Follow-up, To review imaging.    Rashaun Lees MD  Neurosurgery  [facility]     30 minutes were personally spent on this visit including my review of available records, prior imaging, comprehensive physical and neurologic examination and discussion with the patient.     Disclaimer:  This note is prepared using voice recognition software and as such is likely to have errors despite attempts at proofreading.

## 2025-06-02 ENCOUNTER — TELEPHONE (OUTPATIENT)
Dept: NEUROSURGERY | Facility: CLINIC | Age: 80
End: 2025-06-02
Payer: MEDICARE

## 2025-06-19 DIAGNOSIS — M47.22 CERVICAL SPONDYLOSIS WITH RADICULOPATHY: Primary | ICD-10-CM

## 2025-06-19 RX ORDER — CYCLOBENZAPRINE HCL 5 MG
5 TABLET ORAL NIGHTLY PRN
Qty: 90 TABLET | Refills: 1 | Status: SHIPPED | OUTPATIENT
Start: 2025-06-19
